# Patient Record
Sex: FEMALE | Race: WHITE | NOT HISPANIC OR LATINO | Employment: FULL TIME | URBAN - METROPOLITAN AREA
[De-identification: names, ages, dates, MRNs, and addresses within clinical notes are randomized per-mention and may not be internally consistent; named-entity substitution may affect disease eponyms.]

---

## 2017-07-26 ENCOUNTER — COMMUNICATION - HEALTHEAST (OUTPATIENT)
Dept: INTERNAL MEDICINE | Facility: CLINIC | Age: 22
End: 2017-07-26

## 2017-07-26 ENCOUNTER — OFFICE VISIT - HEALTHEAST (OUTPATIENT)
Dept: INTERNAL MEDICINE | Facility: CLINIC | Age: 22
End: 2017-07-26

## 2017-07-26 DIAGNOSIS — Z00.00 PREVENTATIVE HEALTH CARE: ICD-10-CM

## 2017-07-26 LAB
CHOLEST SERPL-MCNC: 154 MG/DL
FASTING STATUS PATIENT QL REPORTED: NORMAL
HDLC SERPL-MCNC: 59 MG/DL
LDLC SERPL CALC-MCNC: 82 MG/DL
TRIGL SERPL-MCNC: 64 MG/DL

## 2017-07-26 ASSESSMENT — MIFFLIN-ST. JEOR: SCORE: 1313.19

## 2017-08-03 LAB
BKR LAB AP ABNORMAL BLEEDING: NO
BKR LAB AP BIRTH CONTROL/HORMONES: NORMAL
BKR LAB AP CERVICAL APPEARANCE: NORMAL
BKR LAB AP GYN ADEQUACY: NORMAL
BKR LAB AP GYN INTERPRETATION: NORMAL
BKR LAB AP HPV REFLEX: NORMAL
BKR LAB AP LMP: NORMAL
BKR LAB AP PATIENT STATUS: NO
BKR LAB AP PREVIOUS ABNORMAL: NORMAL
BKR LAB AP PREVIOUS NORMAL: NORMAL
HIGH RISK?: NO
PATH REPORT.COMMENTS IMP SPEC: NORMAL
RESULT FLAG (HE HISTORICAL CONVERSION): NORMAL

## 2019-01-30 ENCOUNTER — OFFICE VISIT - HEALTHEAST (OUTPATIENT)
Dept: INTERNAL MEDICINE | Facility: CLINIC | Age: 24
End: 2019-01-30

## 2019-01-30 DIAGNOSIS — R10.13 ABDOMINAL PAIN, EPIGASTRIC: ICD-10-CM

## 2019-01-30 LAB
ERYTHROCYTE [DISTWIDTH] IN BLOOD BY AUTOMATED COUNT: 13.3 % (ref 11–14.5)
HCT VFR BLD AUTO: 37.2 % (ref 35–47)
HGB BLD-MCNC: 12.5 G/DL (ref 12–16)
LIPASE SERPL-CCNC: 42 U/L (ref 0–52)
MCH RBC QN AUTO: 27 PG (ref 27–34)
MCHC RBC AUTO-ENTMCNC: 33.5 G/DL (ref 32–36)
MCV RBC AUTO: 80 FL (ref 80–100)
PLATELET # BLD AUTO: 260 THOU/UL (ref 140–440)
PMV BLD AUTO: 8.5 FL (ref 7–10)
RBC # BLD AUTO: 4.62 MILL/UL (ref 3.8–5.4)
WBC: 10.2 THOU/UL (ref 4–11)

## 2019-01-30 ASSESSMENT — MIFFLIN-ST. JEOR: SCORE: 1317.72

## 2019-01-31 ENCOUNTER — COMMUNICATION - HEALTHEAST (OUTPATIENT)
Dept: INTERNAL MEDICINE | Facility: CLINIC | Age: 24
End: 2019-01-31

## 2019-02-14 ENCOUNTER — OFFICE VISIT - HEALTHEAST (OUTPATIENT)
Dept: INTERNAL MEDICINE | Facility: CLINIC | Age: 24
End: 2019-02-14

## 2019-02-14 DIAGNOSIS — J02.0 STREPTOCOCCAL SORE THROAT: ICD-10-CM

## 2019-02-14 DIAGNOSIS — J20.8 ACUTE VIRAL BRONCHITIS: ICD-10-CM

## 2019-02-14 LAB — DEPRECATED S PYO AG THROAT QL EIA: NORMAL

## 2019-02-14 ASSESSMENT — MIFFLIN-ST. JEOR: SCORE: 1322.26

## 2019-02-15 LAB — GROUP A STREP BY PCR: NORMAL

## 2019-02-20 ENCOUNTER — COMMUNICATION - HEALTHEAST (OUTPATIENT)
Dept: INTERNAL MEDICINE | Facility: CLINIC | Age: 24
End: 2019-02-20

## 2019-12-24 ENCOUNTER — COMMUNICATION - HEALTHEAST (OUTPATIENT)
Dept: INTERNAL MEDICINE | Facility: CLINIC | Age: 24
End: 2019-12-24

## 2020-01-05 ENCOUNTER — COMMUNICATION - HEALTHEAST (OUTPATIENT)
Dept: INTERNAL MEDICINE | Facility: CLINIC | Age: 25
End: 2020-01-05

## 2020-01-13 ENCOUNTER — COMMUNICATION - HEALTHEAST (OUTPATIENT)
Dept: INTERNAL MEDICINE | Facility: CLINIC | Age: 25
End: 2020-01-13

## 2020-01-13 ENCOUNTER — AMBULATORY - HEALTHEAST (OUTPATIENT)
Dept: INTERNAL MEDICINE | Facility: CLINIC | Age: 25
End: 2020-01-13

## 2020-01-13 DIAGNOSIS — H69.90 DYSFUNCTION OF EUSTACHIAN TUBE, UNSPECIFIED LATERALITY: ICD-10-CM

## 2020-11-03 ENCOUNTER — COMMUNICATION - HEALTHEAST (OUTPATIENT)
Dept: INTERNAL MEDICINE | Facility: CLINIC | Age: 25
End: 2020-11-03

## 2020-11-03 DIAGNOSIS — R50.9 FEVER, UNSPECIFIED FEVER CAUSE: ICD-10-CM

## 2020-11-08 ENCOUNTER — AMBULATORY - HEALTHEAST (OUTPATIENT)
Dept: FAMILY MEDICINE | Facility: CLINIC | Age: 25
End: 2020-11-08

## 2020-11-08 DIAGNOSIS — R50.9 FEVER, UNSPECIFIED FEVER CAUSE: ICD-10-CM

## 2020-11-11 ENCOUNTER — COMMUNICATION - HEALTHEAST (OUTPATIENT)
Dept: SCHEDULING | Facility: CLINIC | Age: 25
End: 2020-11-11

## 2021-04-03 ENCOUNTER — COMMUNICATION - HEALTHEAST (OUTPATIENT)
Dept: INTERNAL MEDICINE | Facility: CLINIC | Age: 26
End: 2021-04-03

## 2021-04-03 DIAGNOSIS — Z20.822 EXPOSURE TO COVID-19 VIRUS: ICD-10-CM

## 2021-04-08 ENCOUNTER — AMBULATORY - HEALTHEAST (OUTPATIENT)
Dept: LAB | Facility: CLINIC | Age: 26
End: 2021-04-08

## 2021-04-08 DIAGNOSIS — Z20.822 EXPOSURE TO COVID-19 VIRUS: ICD-10-CM

## 2021-04-10 ENCOUNTER — COMMUNICATION - HEALTHEAST (OUTPATIENT)
Dept: SCHEDULING | Facility: CLINIC | Age: 26
End: 2021-04-10

## 2021-05-11 ENCOUNTER — OFFICE VISIT - HEALTHEAST (OUTPATIENT)
Dept: INTERNAL MEDICINE | Facility: CLINIC | Age: 26
End: 2021-05-11

## 2021-05-11 DIAGNOSIS — Z12.4 SCREENING FOR CERVICAL CANCER: ICD-10-CM

## 2021-05-11 DIAGNOSIS — M25.532 LEFT WRIST PAIN: ICD-10-CM

## 2021-05-11 DIAGNOSIS — Z00.00 ENCOUNTER FOR PREVENTIVE CARE: ICD-10-CM

## 2021-05-11 ASSESSMENT — MIFFLIN-ST. JEOR: SCORE: 1331.61

## 2021-05-27 VITALS
OXYGEN SATURATION: 98 % | WEIGHT: 148.19 LBS | HEART RATE: 82 BPM | SYSTOLIC BLOOD PRESSURE: 108 MMHG | BODY MASS INDEX: 27.98 KG/M2 | DIASTOLIC BLOOD PRESSURE: 70 MMHG | HEIGHT: 61 IN | RESPIRATION RATE: 18 BRPM

## 2021-05-31 VITALS — HEIGHT: 60 IN | BODY MASS INDEX: 28.47 KG/M2 | WEIGHT: 145 LBS

## 2021-06-02 VITALS — BODY MASS INDEX: 28.66 KG/M2 | HEIGHT: 60 IN | WEIGHT: 146 LBS

## 2021-06-02 VITALS — HEIGHT: 60 IN | BODY MASS INDEX: 28.86 KG/M2 | WEIGHT: 147 LBS

## 2021-06-04 NOTE — TELEPHONE ENCOUNTER
This illness is very suggestive of flu or other viral illness.  Should improve soon.  She could come in for influenza test, but since it has been one week, tamiful would not be effective.  Would recommend ibuprofen, and/or acetaminophen and fluids and rest and be seen if doesn't improve.  Dr. Parish MD

## 2021-06-12 NOTE — PROGRESS NOTES
Assessment and Plan:    . 1. Preventative health care  Immunizations are up-to-date.  Pap smear, breast exam and physical complete.  Will do routine labs    - Gynecologic Cytology (PAP Smear)  - Basic Metabolic Panel  - Lipid Cascade; Future  - HM2(CBC w/o Differential)        Follow-up in 1 year for repeat Pap.  Please note she wore a tampon prior to this examination today.      Chelsi Chang MD  7/26/2017    Chief Complaint:  Chief Complaint   Patient presents with     Annual Exam       History of Present Illness:  Morena is a 22 y.o. female who is here today for preventative care.  Of note, she has no chief complaints today.  She states she is here at the urging of her family to get a checkup.  She has not been seen in several years.  She was last seen here at the age of 16 when she was diagnosed with mononucleosis.  She recovered uneventfully.  She has not had medical care since.    She has really no chief complaint.  She states her life is going well.  She is a manager at a [employer].  She enjoys working with young people and helping them at their first job.    She is working on leading a healthier lifestyle.  She states that she has reduced soda pop intake.  She is trying to eat healthy on the job.  Have discussed the importance also of keeping her bones healthy.  Recommend 3-4 servings of dairy products per day and regular physical activity.  This was discussed today.    Menstrual history obtained.  She is having regular periods.  She denies any vaginal discharge.  She is not sexually active with men.  She denies any exposures to STDs.    Review of Systems:    The rest of the review of systems are negative for all systems.    PFSH:  Social History: She is single.  She lives with 2 roommates in an apartment.  She has a same-sex significant other.  She denies any problems with difficulty with sexuality, discrimination etc.  History   Smoking Status     Never Smoker   Smokeless Tobacco     Not on  "file       Past Medical History: Mono as a teen  No Known Allergies    Family History: Family history reviewed and updated in the electronic health record  Family History   Problem Relation Age of Onset     Breast cancer Mother      Arthritis Father      Hypertension Father      Thyroid disease Sister      Alcohol abuse Brother      Diabetes Paternal Aunt      Diabetes Paternal Uncle        Physical Exam:  Vitals:    07/26/17 1157   BP: 102/74   Patient Site: Left Arm   Patient Position: Sitting   Cuff Size: Adult Regular   Pulse: (!) 102   Weight: 145 lb (65.8 kg)   Height: 5' 0.25\" (1.53 m)     Wt Readings from Last 3 Encounters:   07/26/17 145 lb (65.8 kg)       General Appearance:  Alert, cooperative, no distress, appears stated age   Head:  Normocephalic, without obvious abnormality, atraumatic   Eyes:  PERRL, conjunctiva/corneas clear, EOM's intact   Ears:  Normal TM's and external ear canals, both ears   Nose: Nares normal, septum midline,mucosa normal, no drainage    Throat: Lips, mucosa, and tongue normal; teeth and gums normal   Neck: Supple, symmetrical, trachea midline, no adenopathy;  thyroid: not enlarged, symmetric, no tenderness/mass/nodules; no carotid bruit or JVD   Back:   Symmetric, no curvature, ROM normal, no CVA tenderness   Lungs:   Clear to auscultation bilaterally, respirations unlabored   Breasts:  No breast masses, tenderness, asymmetry, or nipple discharge.   Heart:  Regular rate and rhythm, S1 and S2 normal, no murmur, rub, or gallop   Abdomen:   Soft, non-tender, bowel sounds active all four quadrants,  no masses, no organomegaly   Genitalia: Normally developed genitalia with no external lesions or eruptions.  Vagina and cervix show no lesions, inflammation, discharge or tenderness.  No cystocele.  Uterus normal size and position.  No adnexal mass or tenderness.   Rectal:   deferred    Extremities: Extremities normal, atraumatic, no cyanosis or edema   Skin: Skin color, texture, turgor " normal, no rashes or lesions   Lymph nodes: Cervical, supraclavicular, and axillary nodes normal   Neurologic: Normal, CN 2-12 intact     Medications:  No current outpatient prescriptions on file.     No current facility-administered medications for this visit.        Immunizations:  Immunization History   Administered Date(s) Administered     Influenza, inj, historic 11/01/2012     Influenza, seasonal,quad inj 6-35 mos 01/09/2001     MMR 07/28/2011     Tdap 07/28/2011     Varicella 11/10/2003

## 2021-06-12 NOTE — TELEPHONE ENCOUNTER
Spoke with patient. Offered her appt today for testing at [facility]. Pt declined. She is requesting Sunday. No appt on [facility] side of scheduling. She will call the scheduling number and see what is available. Pt is not quarantining at this time.

## 2021-06-17 NOTE — PROGRESS NOTES
Assessment and Plan:     1. Encounter for preventive care  Pap smear updated today. Labs deferred until next year. Encourage Covid vaccine before tetanus shot.  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 Encourage regular exercise    2. Screening for cervical cancer    - Gynecologic Cytology (PAP Smear)      3. Left wrist pain  Likely secondary to repetitive motion from her job as a       Chelsi Chang MD  5/11/2021    Chief Complaint:  Chief Complaint   Patient presents with     Annual Exam       History of Present Illness:  Morena is a 26 y.o. female who is here today for a physical examination. Of note, I have not seen her in approximately 4 years. In general, she is doing well. 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 She is very physically active and involved in a variety of tasks on that job. He does get a discrete tendon type pain after repetitive movement. She did purchase an over-the-counter splint. She is wondering what she can do for prevention.    Lifestyle is reviewed. She is due to update dental care. She is due for coronavirus vaccination as well as a tetanus shot. She is due for dental and eye visits.    She is currently living with her mom. She is not in a relationship and is not sexually active. She is a non-smoker    Review of Systems:   Postmenstrual vaginal discharge. The rest of the review of systems are negative for all systems.    PFSH:  Social History: Single-lives with her mom currently. She is a shift lead/manager at [employer]  Social History     Tobacco Use   Smoking Status Never Smoker   Smokeless Tobacco Never Used       Past Medical History:   No Known Allergies    Family History: Her mother and maternal aunt have had breast cancer.  Family History   Problem Relation Age of Onset     Breast cancer Mother      Arthritis Father      Hypertension Father      Thyroid disease Sister      Alcohol abuse Brother      Diabetes Paternal Aunt       "Diabetes Paternal Uncle        Physical Exam:  Vitals:    05/11/21 1023   BP: 108/70   Patient Site: Left Arm   Patient Position: Sitting   Cuff Size: Adult Regular   Pulse: 82   Resp: 18   SpO2: 98%   Weight: 148 lb 3 oz (67.2 kg)   Height: 5' 0.5\" (1.537 m)     Wt Readings from Last 3 Encounters:   05/11/21 148 lb 3 oz (67.2 kg)   02/14/19 147 lb (66.7 kg)   01/30/19 146 lb (66.2 kg)       General Appearance:  Alert, cooperative, no distress, appears stated age   Head:  Normocephalic, without obvious abnormality, atraumatic   Eyes:  PERRL, conjunctiva/corneas clear, EOM's intact   Ears:  Normal TM's and external ear canals, both ears   Nose:  Patient remains masked during examination   Throat:    Neck: Supple, symmetrical, trachea midline, no adenopathy;  thyroid: not enlarged, symmetric, no tenderness/mass/nodules; no carotid bruit or JVD   Back:   Symmetric, no curvature, ROM normal, no CVA tenderness   Lungs:   Clear to auscultation bilaterally, respirations unlabored   Breasts:  No breast masses, tenderness, asymmetry, or nipple discharge.   Heart:  Regular rate and rhythm, S1 and S2 normal, no murmur, rub, or gallop   Abdomen:   Soft, non-tender, bowel sounds active all four quadrants,  no masses, no organomegaly   Genitalia: Normally developed genitalia with no external lesions or eruptions.  Vagina and cervix show no lesions, inflammation,  or tenderness. There is a scant white discharge noted. No cystocele.  Uterus normal size and position.  No adnexal mass or tenderness.   Rectal:     Extremities: Extremities normal, atraumatic, no cyanosis or edema   Skin: Skin color, texture, turgor normal, no rashes or lesions   Lymph nodes: Cervical, supraclavicular, and axillary nodes normal   Neurologic: Normal, CN 2-12 intact     Medications:  No current outpatient medications on file.     No current facility-administered medications for this visit.        Immunizations:  Immunization History   Administered Date(s) " Administered     INFLUENZA,SEASONAL QUAD, PF, =/> 6months 11/01/2020     Influenza, inj, historic,unspecified 11/01/2012, 11/01/2019, 11/01/2020     Influenza, seasonal,quad inj 6-35 mos 01/09/2001     Influenza,inj,MDCK,PF,Quad >4yrs 11/13/2019     MMR 07/28/2011     Td, Adult, Absorbed 01/01/2005     Tdap 07/28/2011     Varicella 11/10/2003

## 2021-06-18 NOTE — LETTER
Letter by Alonzo Bustamante MD at      Author: Alonzo Bustamante MD Service: -- Author Type: --    Filed:  Encounter Date: 1/31/2019 Status: (Other)       Morena Solorzano  [address]  [address] [place] [address]             January 31, 2019         Dear Ms. Solorzano,    Below are the results from your recent visit:    Resulted Orders   HM2(CBC w/o Differential)   Result Value Ref Range    WBC 10.2 4.0 - 11.0 thou/uL    RBC 4.62 3.80 - 5.40 mill/uL    Hemoglobin 12.5 12.0 - 16.0 g/dL    Hematocrit 37.2 35.0 - 47.0 %    MCV 80 80 - 100 fL    MCH 27.0 27.0 - 34.0 pg    MCHC 33.5 32.0 - 36.0 g/dL    RDW 13.3 11.0 - 14.5 %    Platelets 260 140 - 440 thou/uL    MPV 8.5 7.0 - 10.0 fL   Lipase   Result Value Ref Range    Lipase 42 0 - 52 U/L       Morena, both your blood counts and your pancreas test (lipase) are normal.    Please call with questions or contact us using [facility].    Sincerely,        Electronically signed by Alonzo Bustamante MD

## 2021-06-23 NOTE — PROGRESS NOTES
Office Visit - Follow Up   Morena Solorzano   24 y.o. female    Date of Visit: 1/30/2019    Chief Complaint   Patient presents with     Abdominal Pain     Upper mid abdominal pain, started last night, no nausea or vomiting, pain ~5 (1-10)        Assessment and Plan   1. Abdominal pain, epigastric  Onset just began at 10 PM last night.  No previous abdominal  pains.  No other substantial risk factors.  Mom will give her a dose of Pepcid tonight.  And will start some over-the-counter Prilosec at 20 mg daily.  We will check a CBC and a lipase today.  If if her pain progresses to the right upper quadrant and we should look into ultrasound.  Has a very benign abdomen at this time.  - HM2(CBC w/o Differential)  - Lipase          No Follow-up on file.     History of Present Illness   This 24 y.o. old with no real active medical problems.  Developed a sudden onset of epigastric pain last evening at about 10 PM.  It persisted throughout the night but she was able to sleep.  She got up this morning and is still having the same pain.  Felt a bit better with eating something.  She reports no nausea no vomiting no diarrhea no constipation no melena no bright red blood per rectum.  No fevers no chills.  Drinks very little alcohol.  She uses no illicit drugs.  She does not use much at all in the way of NSAIDs.  She tells me the pain somewhat radiates towards the back but not severely.  Pain is generally in the left side of the epigastric area.  She works at [employer] in [place] and had to take off work today and she will miss work tomorrow 2.  She admits that she is under a bit of stress trying to manage her work schedule.  Review of Systems: A comprehensive review of systems was negative except as noted.     Medications, Allergies and Problem List   Reviewed, reconciled and updated     Physical Exam   General Appearance:       /62 (Patient Site: Right Arm, Patient Position: Sitting, Cuff Size: Adult Large)   Pulse 70   " Ht 5' 0.25\" (1.53 m)   Wt 146 lb (66.2 kg)   SpO2 100%   BMI 28.28 kg/m    No prominent epigastric tenderness to palpation.  No masses palpable.  Liver and spleen are not palpably enlarged and nontender.  Bowel sounds are normal.  No guarding no rebound.  No abdominal pain at all to palpation of the remainder the abdomen.       Additional Information   No current outpatient medications on file.     No current facility-administered medications for this visit.      No Known Allergies  Social History     Tobacco Use     Smoking status: Never Smoker   Substance Use Topics     Alcohol use: Not on file     Drug use: Not on file       Review and/or order of clinical lab tests:  Review and/or order of radiology tests:  Review and/or order of medicine tests:  Discussion of test results with performing physician:  Decision to obtain old records and/or obtain history from someone other than the patient:  Review and summarization of old records and/or obtaining history from someone other than the patient and.or discussion of case with another health care provider:  Independent visualization of image, tracing or specimen itself:    Time: total time spent with the patient was 15 minutes of which >50% was spent in counseling and coordination of care     Alonzo Bustamante MD    "

## 2021-06-24 NOTE — PROGRESS NOTES
"  Office Visit - Follow Up   Morena Solorzano   24 y.o. female    Date of Visit: 2/14/2019    Chief Complaint   Patient presents with     Sore Throat     x 4 days Sxs: sore throat, mouth odor         Assessment and Plan   1. Streptococcal sore throat  Strep test is negative will await RNA results.  - Rapid Strep A Screen-Throat  - Group A Strep, RNA Direct Detection, Throat    2. Acute viral bronchitis  The residual sore throat was her main complaint.  He seems to be doing well with resolution of her other symptoms as described below.  We did not check a nasal swab for influenza          No Follow-up on file.     History of Present Illness   This 24 y.o. old reporting onset 3 days ago of sore throat and feverishness.  She had associated aches and chills.  Did not check her temperature.  Symptoms were not severe.  She now reports that her feverishness and chills has resolved.  Her body aches have resolved and her sore throat has persisted.  She has had a mild dry cough but nothing at all prominent.    Review of Systems: A comprehensive review of systems was negative except as noted.     Medications, Allergies and Problem List   Reviewed, reconciled and updated     Physical Exam   General Appearance:       /62 (Patient Site: Left Arm, Patient Position: Sitting, Cuff Size: Adult Regular)   Pulse 90   Temp 98.6  F (37  C)   Ht 5' 0.25\" (1.53 m)   Wt 147 lb (66.7 kg)   SpO2 99%   BMI 28.47 kg/m      Throat is minimally red.  No adenopathy in her posterior nor anterior cervical chain lungs sound entirely clear.  Rapid strep test negative as above     Additional Information   No current outpatient medications on file.     No current facility-administered medications for this visit.      No Known Allergies  Social History     Tobacco Use     Smoking status: Never Smoker     Smokeless tobacco: Never Used   Substance Use Topics     Alcohol use: Not on file     Drug use: Not on file       Review and/or order of " clinical lab tests:  Review and/or order of radiology tests:  Review and/or order of medicine tests:  Discussion of test results with performing physician:  Decision to obtain old records and/or obtain history from someone other than the patient:  Review and summarization of old records and/or obtaining history from someone other than the patient and.or discussion of case with another health care provider:  Independent visualization of image, tracing or specimen itself:    Time: total time spent with the patient was 15 minutes of which >50% was spent in counseling and coordination of care     Alonzo Bustamante MD

## 2021-06-24 NOTE — TELEPHONE ENCOUNTER
Dr. Bustamante,  Please review message below and advise.  Thank you.  Yusra STILES, CMA/CMT....................7:07 AM

## 2021-10-16 ENCOUNTER — HEALTH MAINTENANCE LETTER (OUTPATIENT)
Age: 26
End: 2021-10-16

## 2022-06-25 ASSESSMENT — ENCOUNTER SYMPTOMS
DIARRHEA: 0
NAUSEA: 0
SORE THROAT: 0
DYSURIA: 0
ABDOMINAL PAIN: 0
HEMATOCHEZIA: 0
ARTHRALGIAS: 0
FREQUENCY: 0
DIZZINESS: 0
HEARTBURN: 0
COUGH: 0
EYE PAIN: 0
MYALGIAS: 0
FEVER: 0
HEMATURIA: 0
CONSTIPATION: 0
BREAST MASS: 0
NERVOUS/ANXIOUS: 1
HEADACHES: 0
SHORTNESS OF BREATH: 0
WEAKNESS: 0
JOINT SWELLING: 0
PARESTHESIAS: 0
CHILLS: 0
PALPITATIONS: 0

## 2022-06-25 ASSESSMENT — PATIENT HEALTH QUESTIONNAIRE - PHQ9
SUM OF ALL RESPONSES TO PHQ QUESTIONS 1-9: 16
SUM OF ALL RESPONSES TO PHQ QUESTIONS 1-9: 16
10. IF YOU CHECKED OFF ANY PROBLEMS, HOW DIFFICULT HAVE THESE PROBLEMS MADE IT FOR YOU TO DO YOUR WORK, TAKE CARE OF THINGS AT HOME, OR GET ALONG WITH OTHER PEOPLE: VERY DIFFICULT

## 2022-06-29 ENCOUNTER — OFFICE VISIT (OUTPATIENT)
Dept: INTERNAL MEDICINE | Facility: CLINIC | Age: 27
End: 2022-06-29
Payer: COMMERCIAL

## 2022-06-29 VITALS
SYSTOLIC BLOOD PRESSURE: 118 MMHG | RESPIRATION RATE: 18 BRPM | WEIGHT: 137.9 LBS | BODY MASS INDEX: 27.07 KG/M2 | DIASTOLIC BLOOD PRESSURE: 60 MMHG | HEART RATE: 84 BPM | OXYGEN SATURATION: 100 % | HEIGHT: 60 IN

## 2022-06-29 DIAGNOSIS — Z80.3 FAMILY HISTORY OF MALIGNANT NEOPLASM OF BREAST: ICD-10-CM

## 2022-06-29 DIAGNOSIS — E03.9 ACQUIRED HYPOTHYROIDISM: ICD-10-CM

## 2022-06-29 DIAGNOSIS — Z11.4 SCREENING FOR HIV WITHOUT PRESENCE OF RISK FACTORS: ICD-10-CM

## 2022-06-29 DIAGNOSIS — F32.1 MAJOR DEPRESSIVE DISORDER, SINGLE EPISODE, MODERATE (H): ICD-10-CM

## 2022-06-29 DIAGNOSIS — Z00.00 ENCOUNTER FOR PREVENTIVE CARE: Primary | ICD-10-CM

## 2022-06-29 DIAGNOSIS — Z23 NEED FOR TDAP VACCINATION: ICD-10-CM

## 2022-06-29 LAB
ANION GAP SERPL CALCULATED.3IONS-SCNC: 13 MMOL/L (ref 7–15)
BUN SERPL-MCNC: 9 MG/DL (ref 6–20)
CALCIUM SERPL-MCNC: 9.9 MG/DL (ref 8.6–10)
CHLORIDE SERPL-SCNC: 103 MMOL/L (ref 98–107)
CHOLEST SERPL-MCNC: 180 MG/DL
CREAT SERPL-MCNC: 0.64 MG/DL (ref 0.51–0.95)
DEPRECATED HCO3 PLAS-SCNC: 25 MMOL/L (ref 22–29)
ERYTHROCYTE [DISTWIDTH] IN BLOOD BY AUTOMATED COUNT: 14.2 % (ref 10–15)
GFR SERPL CREATININE-BSD FRML MDRD: >90 ML/MIN/1.73M2
GLUCOSE SERPL-MCNC: 76 MG/DL (ref 70–99)
HBA1C MFR BLD: 5.3 % (ref 0–5.6)
HCT VFR BLD AUTO: 39.6 % (ref 35–47)
HDLC SERPL-MCNC: 75 MG/DL
HGB BLD-MCNC: 12.8 G/DL (ref 11.7–15.7)
LDLC SERPL CALC-MCNC: 91 MG/DL
MCH RBC QN AUTO: 27.1 PG (ref 26.5–33)
MCHC RBC AUTO-ENTMCNC: 32.3 G/DL (ref 31.5–36.5)
MCV RBC AUTO: 84 FL (ref 78–100)
NONHDLC SERPL-MCNC: 105 MG/DL
PLATELET # BLD AUTO: 296 10E3/UL (ref 150–450)
POTASSIUM SERPL-SCNC: 4.1 MMOL/L (ref 3.4–5.3)
RBC # BLD AUTO: 4.72 10E6/UL (ref 3.8–5.2)
SODIUM SERPL-SCNC: 141 MMOL/L (ref 136–145)
T4 FREE SERPL-MCNC: 1.08 NG/DL (ref 0.9–1.7)
TRIGL SERPL-MCNC: 70 MG/DL
TSH SERPL DL<=0.005 MIU/L-ACNC: 9.64 UIU/ML (ref 0.3–4.2)
WBC # BLD AUTO: 6.4 10E3/UL (ref 4–11)

## 2022-06-29 PROCEDURE — 87389 HIV-1 AG W/HIV-1&-2 AB AG IA: CPT | Performed by: INTERNAL MEDICINE

## 2022-06-29 PROCEDURE — 90715 TDAP VACCINE 7 YRS/> IM: CPT | Performed by: INTERNAL MEDICINE

## 2022-06-29 PROCEDURE — 90471 IMMUNIZATION ADMIN: CPT | Performed by: INTERNAL MEDICINE

## 2022-06-29 PROCEDURE — 84439 ASSAY OF FREE THYROXINE: CPT | Performed by: INTERNAL MEDICINE

## 2022-06-29 PROCEDURE — 83036 HEMOGLOBIN GLYCOSYLATED A1C: CPT | Performed by: INTERNAL MEDICINE

## 2022-06-29 PROCEDURE — 80048 BASIC METABOLIC PNL TOTAL CA: CPT | Performed by: INTERNAL MEDICINE

## 2022-06-29 PROCEDURE — 36415 COLL VENOUS BLD VENIPUNCTURE: CPT | Performed by: INTERNAL MEDICINE

## 2022-06-29 PROCEDURE — 80061 LIPID PANEL: CPT | Performed by: INTERNAL MEDICINE

## 2022-06-29 PROCEDURE — 85027 COMPLETE CBC AUTOMATED: CPT | Performed by: INTERNAL MEDICINE

## 2022-06-29 PROCEDURE — 99395 PREV VISIT EST AGE 18-39: CPT | Mod: 25 | Performed by: INTERNAL MEDICINE

## 2022-06-29 PROCEDURE — 84443 ASSAY THYROID STIM HORMONE: CPT | Performed by: INTERNAL MEDICINE

## 2022-06-29 ASSESSMENT — ENCOUNTER SYMPTOMS
BREAST MASS: 0
EYE PAIN: 0
MYALGIAS: 0
SHORTNESS OF BREATH: 0
JOINT SWELLING: 0
HEADACHES: 0
SORE THROAT: 0
DYSURIA: 0
HEMATOCHEZIA: 0
PARESTHESIAS: 0
ABDOMINAL PAIN: 0
COUGH: 0
CONSTIPATION: 0
HEMATURIA: 0
DIZZINESS: 0
WEAKNESS: 0
DIARRHEA: 0
ARTHRALGIAS: 0
CHILLS: 0
HEARTBURN: 0
PALPITATIONS: 0
FEVER: 0
FREQUENCY: 0
NERVOUS/ANXIOUS: 1
NAUSEA: 0

## 2022-06-29 NOTE — PROGRESS NOTES
"   SUBJECTIVE:   CC: Morena Solorzano is an 27 year old woman who presents for preventive health visit.       Patient has been advised of split billing requirements and indicates understanding: Yes  Morena is a delightful 27-year-old female who is here today for an annual physical examination.  Overall, she is healthy.  She has had some minor issues over the years.  She contacted me in August with some minor vaginal spotting.  She was referred to gynecology.  However, this past and she has not had any further recurrent symptoms.  Her menstrual cycle is occurring on a regular basis every 28 to 29 days.  She is not on birth control currently and is not sexually active.    She has some concerns today regarding mood.  Her PHQ-9 is approximately 16.  She states she has intermittent episodes of depression.  She states she believes that these tend to occur around the time of her menstrual cycle.  She notes irritability and an overall sad mood.  She states that she can \"get myself out of it \".  She states that she is not interested in medications.  She has some concerns about this.  She states she would try more natural remedies.  She has started herself on vitamin D, magnesium and some other supplements.  Of note, she does not exercise regularly.  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  She also is a , but will be resigning from this position to work on her overall health.    She states that she is not suicidal.  She feels sad at times.  She has low motivation and energy at times.  She states she had a 3-month vacation in [place] with a friend.  She states this was very beneficial.    Her family history is reviewed and updated.  Her brother has issues with chemical dependence.  Her mother and sister have some history of depression and have been on medications in the past.    There is a strong family history of breast cancer.  Her mother, aunt and maternal grandmother have " all had breast cancer.  Her mother is dealing with her second occurrence.    Healthy Habits:     Getting at least 3 servings of Calcium per day:  NO    Bi-annual eye exam:  NO    Dental care twice a year:  NO    Sleep apnea or symptoms of sleep apnea:  Daytime drowsiness    Diet:  Regular (no restrictions) and Other    Frequency of exercise:  2-3 days/week    Duration of exercise:  Less than 15 minutes    Taking medications regularly:  Yes    Medication side effects:  None    PHQ-2 Total Score: 3    Additional concerns today:  Yes            Today's PHQ-2 Score:   PHQ-2 ( 1999 Pf[employer]) 6/25/2022   Q1: Little interest or pleasure in doing things 1   Q2: Feeling down, depressed or hopeless 2   PHQ-2 Score 3   Q1: Little interest or pleasure in doing things Several days   Q2: Feeling down, depressed or hopeless More than half the days   PHQ-2 Score 3     Answers for HPI/ROS submitted by the patient on 6/25/2022  If you checked off any problems, how difficult have these problems made it for you to do your work, take care of things at home, or get along with other people?: Very difficult  PHQ9 TOTAL SCORE: 16        Abuse: Current or Past (Physical, Sexual or Emotional) - No  Do you feel safe in your environment? Yes    Have you ever done Advance Care Planning? (For example, a Health Directive, POLST, or a discussion with a medical provider or your loved ones about your wishes): No, advance care planning information given to patient to review.  Patient plans to discuss their wishes with loved ones or provider.      Social History     Tobacco Use     Smoking status: Never Smoker     Smokeless tobacco: Never Used   Substance Use Topics     Alcohol use: Not on file     If you drink alcohol do you typically have >3 drinks per day or >7 drinks per week? No    Alcohol Use 6/25/2022   Prescreen: >3 drinks/day or >7 drinks/week? No   No flowsheet data found.    Reviewed orders with patient.  Reviewed health maintenance and  updated orders accordingly - Yes  Lab work is in process    Breast Cancer Screening: She will inquire about whether genetic screening was done on her mother.  We will begin breast cancer screening at age 35.    FHS-7:   Breast CA Risk Assessment (FHS-7) 6/25/2022   Did any of your first-degree relatives have breast or ovarian cancer? Yes   Did any of your relatives have bilateral breast cancer? Yes   Did any man in your family have breast cancer? No   Did any woman in your family have breast and ovarian cancer? No   Did any woman in your family have breast cancer before age 50 y? Yes   Do you have 2 or more relatives with breast and/or ovarian cancer? Yes   Do you have 2 or more relatives with breast and/or bowel cancer? Yes         History of abnormal Pap smear:  PAP / HPV Latest Ref Rng & Units 5/11/2021 7/26/2017   PAP Negative for squamous intraepithelial lesion or malignancy. Negative for squamous intraepithelial lesion or malignancy  Electronically signed by Lneka Mejia CT (ASCP) on 5/12/2021 at  2:48 PM   Negative for squamous intraepithelial lesion or malignancy  Electronically signed by Nitza Noriega CT (ASCP) on 8/3/2017 at 11:54 AM       Reviewed and updated as needed this visit by clinical staff    Allergies  Meds                Reviewed and updated as needed this visit by Provider                   History reviewed. No pertinent past medical history.     Review of Systems   Constitutional: Negative for chills and fever.   HENT: Positive for congestion. Negative for ear pain, hearing loss and sore throat.    Eyes: Positive for visual disturbance. Negative for pain.   Respiratory: Negative for cough and shortness of breath.    Cardiovascular: Negative for chest pain, palpitations and peripheral edema.   Gastrointestinal: Negative for abdominal pain, constipation, diarrhea, heartburn, hematochezia and nausea.   Breasts:  Negative for tenderness, breast mass and discharge.   Genitourinary:  "Negative for dysuria, frequency, genital sores, hematuria, pelvic pain, urgency, vaginal bleeding and vaginal discharge.   Musculoskeletal: Negative for arthralgias, joint swelling and myalgias.   Skin: Negative for rash.   Neurological: Negative for dizziness, weakness, headaches and paresthesias.   Psychiatric/Behavioral: Positive for mood changes. The patient is nervous/anxious.      CONSTITUTIONAL: NEGATIVE for fever, chills, change in weight  INTEGUMENTARY/SKIN: NEGATIVE for worrisome rashes, moles or lesions  EYES: NEGATIVE for vision changes or irritation  ENT: NEGATIVE for ear, mouth and throat problems  RESP: NEGATIVE for significant cough or SOB  BREAST: NEGATIVE for masses, tenderness or discharge  CV: NEGATIVE for chest pain, palpitations or peripheral edema  GI: NEGATIVE for nausea, abdominal pain, heartburn, or change in bowel habits  : NEGATIVE for unusual urinary or vaginal symptoms. No vaginal bleeding.  MUSCULOSKELETAL: NEGATIVE for significant arthralgias or myalgia  NEURO: NEGATIVE for weakness, dizziness or paresthesias  PSYCHIATRIC: NEGATIVE for changes in mood or affect      OBJECTIVE:   Resp 18   Ht 1.53 m (5' 0.25\")   Wt 62.6 kg (137 lb 14.4 oz)   LMP 06/15/2022   BMI 26.71 kg/m    Physical Exam  GENERAL APPEARANCE: healthy, alert and no distress  EYES: Eyes grossly normal to inspection, PERRL and conjunctivae and sclerae normal  HENT: ear canals and TM's normal, nose and mouth without ulcers or lesions, oropharynx clear and oral mucous membranes moist  NECK: no adenopathy, no asymmetry, masses, or scars and thyroid normal to palpation  RESP: lungs clear to auscultation - no rales, rhonchi or wheezes  BREAST: normal without masses, tenderness or nipple discharge and no palpable axillary masses or adenopathy  CV: regular rate and rhythm, normal S1 S2, no S3 or S4, no murmur, click or rub, no peripheral edema and peripheral pulses strong  ABDOMEN: soft, nontender, no hepatosplenomegaly, " no masses and bowel sounds normal  MS: no musculoskeletal defects are noted and gait is age appropriate without ataxia  SKIN: no suspicious lesions or rashes  NEURO: Normal strength and tone, sensory exam grossly normal, mentation intact and speech normal  PSYCH: mentation appears normal and affect normal/bright        ASSESSMENT/PLAN:   (Z00.00) Encounter for preventive care  (primary encounter diagnosis)  Comment: We will update screening labs today.  Encourage regular daily exercise and good nutrition.  Plan: CBC with platelets, Hemoglobin A1c, Basic         metabolic panel, Lipid panel reflex to direct         LDL Fasting            (F32.1) Major depressive disorder, single episode, moderate (H)  Comment: Have discussed depression in detail.  Currently, she would like to hold on medications.  However, I recommend that she establish with a therapist and a referral was placed.  She is also given information to inquire with [facility] as well.  She should begin a healthy exercise program.  I agree that vitamin D and magnesium could be helpful.    For medication, I would like her to inquire about family history use of meds and things that have been helpful.  Also, she may be a good candidate for fluoxetine and that she has symptoms that also may be consistent with PMDD.  Fluoxetine has a longer half-life and with her erratic schedule, this might be something helpful to avoid withdrawal if she is late on a dose/etc.  She is going to contemplate this.  Plan: Adult Mental Health  Referral, TSH         with free T4 reflex        Would like to follow-up with her in 6 weeks    (Z80.3) Family history of malignant neoplasm of breast  Comment: Encourage information gathering with regard to genetic status of mother or grandmother  Plan: Screening for breast cancer should begin mid 30s    (Z11.4) Screening for HIV without presence of risk factors  Comment:   Plan: HIV Antigen Antibody Combo         "    (Z23) Need for Tdap vaccination  Comment:   Plan: TDAP VACCINE (Adacel, Boostrix)  [[id number]]              Patient has been advised of split billing requirements and indicates understanding: No    COUNSELING:  Reviewed preventive health counseling, as reflected in patient instructions       Regular exercise    Estimated body mass index is 26.71 kg/m  as calculated from the following:    Height as of this encounter: 1.53 m (5' 0.25\").    Weight as of this encounter: 62.6 kg (137 lb 14.4 oz).    Weight management plan: Discussed healthy diet and exercise guidelines    She reports that she has never smoked. She has never used smokeless tobacco.      Counseling Resources:  ATP IV Guidelines  Pooled Cohorts Equation Calculator  Breast Cancer Risk Calculator  BRCA-Related Cancer Risk Assessment: FHS-7 Tool  FRAX Risk Assessment  ICSI Preventive Guidelines  Dietary Guidelines for Americans, 2010  USDA's MyPlate  ASA Prophylaxis  Lung CA Screening    Chelsi Chang MD  [facility]    "

## 2022-06-30 LAB — HIV 1+2 AB+HIV1 P24 AG SERPL QL IA: NONREACTIVE

## 2022-06-30 RX ORDER — LEVOTHYROXINE SODIUM 75 UG/1
75 TABLET ORAL DAILY
Qty: 90 TABLET | Refills: 3 | Status: SHIPPED | OUTPATIENT
Start: 2022-06-30 | End: 2022-10-06

## 2022-09-25 ENCOUNTER — HEALTH MAINTENANCE LETTER (OUTPATIENT)
Age: 27
End: 2022-09-25

## 2022-10-06 ENCOUNTER — MYC MEDICAL ADVICE (OUTPATIENT)
Dept: INTERNAL MEDICINE | Facility: CLINIC | Age: 27
End: 2022-10-06

## 2022-10-06 DIAGNOSIS — E03.9 ACQUIRED HYPOTHYROIDISM: ICD-10-CM

## 2022-10-06 RX ORDER — LEVOTHYROXINE SODIUM 75 UG/1
75 TABLET ORAL DAILY
Qty: 90 TABLET | Refills: 3 | Status: SHIPPED | OUTPATIENT
Start: 2022-10-06 | End: 2022-10-18

## 2022-10-11 ENCOUNTER — LAB (OUTPATIENT)
Dept: LAB | Facility: CLINIC | Age: 27
End: 2022-10-11
Payer: COMMERCIAL

## 2022-10-11 DIAGNOSIS — E03.9 ACQUIRED HYPOTHYROIDISM: ICD-10-CM

## 2022-10-11 LAB
T4 FREE SERPL-MCNC: 0.99 NG/DL (ref 0.9–1.7)
TSH SERPL DL<=0.005 MIU/L-ACNC: 5.95 UIU/ML (ref 0.3–4.2)

## 2022-10-11 PROCEDURE — 84443 ASSAY THYROID STIM HORMONE: CPT

## 2022-10-11 PROCEDURE — 84439 ASSAY OF FREE THYROXINE: CPT

## 2022-10-11 PROCEDURE — 36415 COLL VENOUS BLD VENIPUNCTURE: CPT

## 2022-10-18 DIAGNOSIS — E03.9 ACQUIRED HYPOTHYROIDISM: ICD-10-CM

## 2022-10-18 RX ORDER — LEVOTHYROXINE SODIUM 88 UG/1
88 TABLET ORAL DAILY
Qty: 90 TABLET | Refills: 1 | Status: SHIPPED | OUTPATIENT
Start: 2022-10-18 | End: 2023-07-27

## 2023-01-13 ENCOUNTER — E-VISIT (OUTPATIENT)
Dept: URGENT CARE | Facility: CLINIC | Age: 28
End: 2023-01-13
Payer: COMMERCIAL

## 2023-01-13 DIAGNOSIS — J01.90 ACUTE SINUSITIS WITH SYMPTOMS > 10 DAYS: Primary | ICD-10-CM

## 2023-01-13 PROCEDURE — 99421 OL DIG E/M SVC 5-10 MIN: CPT | Performed by: PHYSICIAN ASSISTANT

## 2023-01-13 NOTE — PATIENT INSTRUCTIONS
Dear Morena Solorzano,    After reviewing your responses, I've been able to diagnose you with?a sinus infection caused by a bacteria.?Sinusitis almost always starts as a viral infection but antibiotics are recommend to treat a secondary bacterial infection if symptoms persist beyond 14 days. I sent in Augmentin to your pharmacy to treat this infection.    Symptomatic care will help you feel better. It is important to stay well hydrated and get lots of rest. If you are able to taking the following over the counter medications per your primary care provider, these will help ease your symptoms. A decongestant like Sudafed (pseudoephedrine) is available behind the pharmacist counter and helps to relieve congestion. Take Tylenol or an NSAID such as ibuprofen or naproxen as needed for pain. A nasal rinse such as the Netti pot with bottled or distilled water and saline packets helps to flush sinuses. Mucinex (guiafenesin) thins mucus and may help it to loosen more quickly. Flonase (fluticasone nasal spray), 2 sprays in each nostril daily, reduces swelling in your nasal passages. You can also sit in the bathroom with the door closed and hot shower running to loosen mucus.    If your symptoms significantly worsen, you develop severe headache, vomiting, high fever (>102F), or are not improving in 7 days, please contact your primary care provider for an appointment or visit any of our convenient Walk-in Care or Urgent Care Centers to be seen which can be found on our website?here.?     Thanks again for choosing?us?as your health care partner,?   ?  Zari Padilla PA-C?

## 2023-02-26 ENCOUNTER — E-VISIT (OUTPATIENT)
Dept: URGENT CARE | Facility: CLINIC | Age: 28
End: 2023-02-26
Payer: COMMERCIAL

## 2023-02-26 DIAGNOSIS — H10.31 ACUTE BACTERIAL CONJUNCTIVITIS OF RIGHT EYE: Primary | ICD-10-CM

## 2023-02-26 PROCEDURE — 99421 OL DIG E/M SVC 5-10 MIN: CPT | Performed by: FAMILY MEDICINE

## 2023-02-26 RX ORDER — POLYMYXIN B SULFATE AND TRIMETHOPRIM 1; 10000 MG/ML; [USP'U]/ML
SOLUTION OPHTHALMIC
Qty: 10 ML | Refills: 0 | Status: SHIPPED | OUTPATIENT
Start: 2023-02-26 | End: 2023-03-05

## 2023-02-26 NOTE — PATIENT INSTRUCTIONS
Thank you for choosing us for your care. I have placed an order for a prescription so that you can start treatment. View your full visit summary for details by clicking on the link below. Your pharmacist will able to address any questions you may have about the medication.     If you re not feeling better within 2-3 days, please schedule an appointment.  You can schedule an appointment right here in [facility], or call [phone]  If the visit is for the same symptoms as your eVisit, we ll refund the cost of your eVisit if seen within seven days.      Bacterial Conjunctivitis    You have an infection in the membranes covering the white part of the eye. This part of the eye is called the conjunctiva. The infection is called conjunctivitis. The most common symptoms of conjunctivitis include a thick, pus-like discharge from the eye, swollen eyelids, redness, eyelids sticking together upon awakening, and a gritty or scratchy feeling in the eye. Your infection was caused by bacteria. It may be treated with medicine. With treatment, the infection takes about 7 to 10 days to resolve.   Home care    Use prescribed antibiotic eye drops or ointment as directed to treat the infection.    Apply a warm compress (towel soaked in warm water) to the affected eye 3 to 4 times a day. Do this just before applying medicine to the eye.    Use a warm, wet cloth to wipe away crusting of the eyelids in the morning. This is caused by mucus drainage during the night. You may also use saline irrigating solution or artificial tears to rinse away mucus in the eye. Do not put a patch over the eye.    Wash your hands before and after touching the infected eye. This is to prevent spreading the infection to the other eye, and to other people. Don't share your towels or washcloths with others.    You may use acetaminophen or ibuprofen to control pain, unless another medicine was prescribed. Talk with your healthcare provider before using these  medicines if you have chronic liver or kidney disease. Also talk with your provider if you have ever had a stomach ulcer or digestive bleeding.    Don't wear contact lenses until your eyes have healed and all symptoms are gone.    Follow-up care  Follow up with your healthcare provider, or as advised.  When to seek medical advice  Call your healthcare provider right away if any of these occur:    Worsening vision    Increasing pain in the eye    Increasing swelling or redness of the eyelid    Redness spreading around the eye  [employer] last reviewed this educational content on 4/1/2020 2000-2021 [employer]. All rights reserved. This information is not intended as a substitute for professional medical care. Always follow your healthcare professional's instructions.

## 2023-02-27 ENCOUNTER — E-VISIT (OUTPATIENT)
Dept: URGENT CARE | Facility: CLINIC | Age: 28
End: 2023-02-27
Payer: COMMERCIAL

## 2023-02-27 DIAGNOSIS — B37.0 THRUSH: Primary | ICD-10-CM

## 2023-02-27 PROCEDURE — 99421 OL DIG E/M SVC 5-10 MIN: CPT | Performed by: EMERGENCY MEDICINE

## 2023-02-27 RX ORDER — CLOTRIMAZOLE 10 MG/1
10 LOZENGE ORAL
Qty: 35 LOZENGE | Refills: 0 | Status: SHIPPED | OUTPATIENT
Start: 2023-02-27 | End: 2023-03-06

## 2023-02-27 NOTE — PATIENT INSTRUCTIONS
Dear Morena Solorzano    Medication for thrust ordered.    Thanks for choosing us as your health care partner,    Tommy Preciado MD

## 2023-03-10 ENCOUNTER — E-VISIT (OUTPATIENT)
Dept: URGENT CARE | Facility: CLINIC | Age: 28
End: 2023-03-10
Payer: COMMERCIAL

## 2023-03-10 DIAGNOSIS — J02.9 SORE THROAT: Primary | ICD-10-CM

## 2023-03-10 PROCEDURE — 99421 OL DIG E/M SVC 5-10 MIN: CPT | Performed by: PHYSICIAN ASSISTANT

## 2023-03-10 NOTE — PATIENT INSTRUCTIONS
Hello,   Based on your evisit, I recommend getting the lab for strep performed. I have placed the order for this. You can get scheduled for a lab only visit via [facility] or our scheduling line [phone] ([phone]).     Thank you,   Ayden Carranza PA-C

## 2023-07-26 ASSESSMENT — PATIENT HEALTH QUESTIONNAIRE - PHQ9
SUM OF ALL RESPONSES TO PHQ QUESTIONS 1-9: 13
10. IF YOU CHECKED OFF ANY PROBLEMS, HOW DIFFICULT HAVE THESE PROBLEMS MADE IT FOR YOU TO DO YOUR WORK, TAKE CARE OF THINGS AT HOME, OR GET ALONG WITH OTHER PEOPLE: SOMEWHAT DIFFICULT
SUM OF ALL RESPONSES TO PHQ QUESTIONS 1-9: 13

## 2023-07-26 ASSESSMENT — ANXIETY QUESTIONNAIRES
GAD7 TOTAL SCORE: 2
1. FEELING NERVOUS, ANXIOUS, OR ON EDGE: NOT AT ALL
7. FEELING AFRAID AS IF SOMETHING AWFUL MIGHT HAPPEN: NOT AT ALL
5. BEING SO RESTLESS THAT IT IS HARD TO SIT STILL: NOT AT ALL
4. TROUBLE RELAXING: MORE THAN HALF THE DAYS
IF YOU CHECKED OFF ANY PROBLEMS ON THIS QUESTIONNAIRE, HOW DIFFICULT HAVE THESE PROBLEMS MADE IT FOR YOU TO DO YOUR WORK, TAKE CARE OF THINGS AT HOME, OR GET ALONG WITH OTHER PEOPLE: NOT DIFFICULT AT ALL
2. NOT BEING ABLE TO STOP OR CONTROL WORRYING: NOT AT ALL
6. BECOMING EASILY ANNOYED OR IRRITABLE: NOT AT ALL
GAD7 TOTAL SCORE: 2
3. WORRYING TOO MUCH ABOUT DIFFERENT THINGS: NOT AT ALL

## 2023-07-27 ENCOUNTER — OFFICE VISIT (OUTPATIENT)
Dept: PEDIATRICS | Facility: CLINIC | Age: 28
End: 2023-07-27
Payer: COMMERCIAL

## 2023-07-27 VITALS
OXYGEN SATURATION: 97 % | BODY MASS INDEX: 28.02 KG/M2 | HEIGHT: 61 IN | TEMPERATURE: 98.9 F | RESPIRATION RATE: 20 BRPM | WEIGHT: 148.4 LBS | SYSTOLIC BLOOD PRESSURE: 119 MMHG | DIASTOLIC BLOOD PRESSURE: 79 MMHG | HEART RATE: 88 BPM

## 2023-07-27 DIAGNOSIS — F32.1 MAJOR DEPRESSIVE DISORDER, SINGLE EPISODE, MODERATE (H): ICD-10-CM

## 2023-07-27 DIAGNOSIS — E03.9 ACQUIRED HYPOTHYROIDISM: ICD-10-CM

## 2023-07-27 DIAGNOSIS — R59.9 SWOLLEN LYMPH NODES: ICD-10-CM

## 2023-07-27 DIAGNOSIS — Z00.00 ENCOUNTER FOR MEDICAL EXAMINATION TO ESTABLISH CARE: Primary | ICD-10-CM

## 2023-07-27 PROCEDURE — 99214 OFFICE O/P EST MOD 30 MIN: CPT | Performed by: INTERNAL MEDICINE

## 2023-07-27 RX ORDER — LEVOTHYROXINE SODIUM 88 UG/1
88 TABLET ORAL DAILY
Qty: 90 TABLET | Refills: 0 | Status: SHIPPED | OUTPATIENT
Start: 2023-07-27 | End: 2023-11-01

## 2023-07-27 ASSESSMENT — PAIN SCALES - GENERAL: PAINLEVEL: MILD PAIN (2)

## 2023-07-27 ASSESSMENT — ENCOUNTER SYMPTOMS: SORE THROAT: 1

## 2023-07-27 NOTE — PATIENT INSTRUCTIONS
1. Encounter for medical examination to establish care    2. hypothyroidism  Will fill script for 88 mcg daily - please check a lab in 2 months    - levothyroxine (EUTHYROX) 88 MCG tablet; Take 1 tablet (88 mcg) by mouth daily  Dispense: 90 tablet; Refill: 0  - **TSH with free T4 reflex FUTURE 2mo; Future    3. Major depressive disorder, single episode, moderate (H)  Monitor symptoms and let me know if you would like a referral for therapy or to consider starting medications.    4. Swollen lymph nodes  Monitor - should go away within a few weeks.

## 2023-07-27 NOTE — PROGRESS NOTES
"  Assessment & Plan     Encounter for medical examination to establish care  Reviewed medical history.    Acquired hypothyroidism  Recently increased to 88 due to elevated TSH - ran out of 88 mcg so went back on her 75 mcg for the past few weeks.  Will increase back to 88 mcg and check labs in 2 months.  - levothyroxine (EUTHYROX) 88 MCG tablet; Take 1 tablet (88 mcg) by mouth daily  - **TSH with free T4 reflex FUTURE 2mo; Future    Major depressive disorder, single episode, moderate (H)  Not on meds - discussed this - pt is stable and doing well with lifestyle modifications (stress management, sleep hygiene etc).  Will continue to monitor.    Swollen lymph nodes  See PI    Patient Instructions   1. Encounter for medical examination to establish care    2. hypothyroidism  Will fill script for 88 mcg daily - please check a lab in 2 months    - levothyroxine (EUTHYROX) 88 MCG tablet; Take 1 tablet (88 mcg) by mouth daily  Dispense: 90 tablet; Refill: 0  - **TSH with free T4 reflex FUTURE 2mo; Future    3. Major depressive disorder, single episode, moderate (H)  Monitor symptoms and let me know if you would like a referral for therapy or to consider starting medications.    4. Swollen lymph nodes  Monitor - should go away within a few weeks.         BMI:   Estimated body mass index is 28.38 kg/m  as calculated from the following:    Height as of this encounter: 1.54 m (5' 0.63\").    Weight as of this encounter: 67.3 kg (148 lb 6.4 oz).   Weight management plan: Discussed healthy diet and exercise guidelines    Depression Screening Follow Up        7/26/2023    11:05 PM   PHQ   PHQ-9 Total Score 13   Q9: Thoughts of better off dead/self-harm past 2 weeks Not at all         7/26/2023    11:05 PM   Last PHQ-9   1.  Little interest or pleasure in doing things 2   2.  Feeling down, depressed, or hopeless 2   3.  Trouble falling or staying asleep, or sleeping too much 1   4.  Feeling tired or having little energy 2   5.  " "Poor appetite or overeating 2   6.  Feeling bad about yourself 2   7.  Trouble concentrating 2   8.  Moving slowly or restless 0   Q9: Thoughts of better off dead/self-harm past 2 weeks 0   PHQ-9 Total Score 13       Follow Up Actions Taken  Patient counseled, no additional follow up at this time.     Regular exercise    Liborio Alan MD  [facility] TJ Berg is a 28 year old, presenting for the following health issues:  Establish Care, Refill Request (levothyroxine (EUTHYROX) 88 MCG table), Sinus Problem (Pressure), and Pharyngitis      7/27/2023    10:36 AM   Additional Questions   Roomed by Sofia Olivares   Accompanied by N/A         7/27/2023    10:36 AM   Patient Reported Additional Medications   Patient reports taking the following new medications No       Sinus Problem   Associated symptoms include sore throat.   Pharyngitis     History of Present Illness       Hypothyroidism:     Since last visit, patient describes the following symptoms::  Anxiety    She eats 2-3 servings of fruits and vegetables daily.She consumes 2 sweetened beverage(s) daily.She exercises with enough effort to increase her heart rate 20 to 29 minutes per day.  She exercises with enough effort to increase her heart rate 3 or less days per week.   She is not taking prescribed medications regularly due to remembering to take.         Review of Systems   HENT:  Positive for sore throat.       Constitutional, HEENT, cardiovascular, pulmonary, gi and gu systems are negative, except as otherwise noted.      Objective    /79 (BP Location: Right arm, Patient Position: Sitting, Cuff Size: Adult Regular)   Pulse 88   Temp 98.9  F (37.2  C) (Oral)   Resp 20   Ht 1.54 m (5' 0.63\")   Wt 67.3 kg (148 lb 6.4 oz)   LMP 07/13/2023 (Approximate)   SpO2 97%   BMI 28.38 kg/m    Body mass index is 28.38 kg/m .  Physical Exam   GENERAL: healthy, alert and no distress  NECK: cervical adenopathy bilateral, tender " and thyroid normal to palpation  RESP: lungs clear to auscultation - no rales, rhonchi or wheezes  CV: regular rate and rhythm, normal S1 S2, no S3 or S4, no murmur, click or rub, no peripheral edema and peripheral pulses strong  ABDOMEN: soft, nontender, no hepatosplenomegaly, no masses and bowel sounds normal  MS: no gross musculoskeletal defects noted, no edema                    Answers submitted by the patient for this visit:  Patient Health Questionnaire (Submitted on 7/26/2023)  If you checked off any problems, how difficult have these problems made it for you to do your work, take care of things at home, or get along with other people?: Somewhat difficult  PHQ9 TOTAL SCORE: 13  SHERICE-7 (Submitted on 7/26/2023)  SHERICE 7 TOTAL SCORE: 2  Hypothyroid  (Submitted on 7/26/2023)  Chief Complaint: Chronic problems general questions HPI Form  Since last visit, patient describes the following symptoms:: Anxiety  General Questionnaire (Submitted on 7/26/2023)  Chief Complaint: Chronic problems general questions HPI Form  How many servings of fruits and vegetables do you eat daily?: 2-3  On average, how many sweetened beverages do you drink each day (Examples: soda, juice, sweet tea, etc.  Do NOT count diet or artificially sweetened beverages)?: 2  How many minutes a day do you exercise enough to make your heart beat faster?: 20 to 29  How many days a week do you exercise enough to make your heart beat faster?: 3 or less  How many days per week do you miss taking your medication?: 3  What makes it hard for you to take your medication every day?: remembering to take

## 2023-08-06 ENCOUNTER — HEALTH MAINTENANCE LETTER (OUTPATIENT)
Age: 28
End: 2023-08-06

## 2023-09-27 ENCOUNTER — LAB (OUTPATIENT)
Dept: LAB | Facility: CLINIC | Age: 28
End: 2023-09-27
Payer: COMMERCIAL

## 2023-09-27 DIAGNOSIS — E03.9 ACQUIRED HYPOTHYROIDISM: ICD-10-CM

## 2023-09-27 LAB — TSH SERPL DL<=0.005 MIU/L-ACNC: 3.16 UIU/ML (ref 0.3–4.2)

## 2023-09-27 PROCEDURE — 84443 ASSAY THYROID STIM HORMONE: CPT

## 2023-09-27 PROCEDURE — 36415 COLL VENOUS BLD VENIPUNCTURE: CPT

## 2023-09-28 DIAGNOSIS — E03.9 ACQUIRED HYPOTHYROIDISM: Primary | ICD-10-CM

## 2023-09-28 NOTE — RESULT ENCOUNTER NOTE
Dear Morena,    Your lab results are normal.  At this time, I do not recommend any changes to your current thyroid medications.  I do recommend we check this level one more time in about 6 months (then, if normal, we can resume checking yearly at your physical).  I have placed the order - please schedule another lab-only visit at your convenience (around March-April 2024).    Please feel free to call with any questions.  Otherwise, we can discuss further at your next appointment.    Sincerely,    Liborio Alan MD

## 2023-11-01 ENCOUNTER — MYC REFILL (OUTPATIENT)
Dept: PEDIATRICS | Facility: CLINIC | Age: 28
End: 2023-11-01
Payer: COMMERCIAL

## 2023-11-01 DIAGNOSIS — E03.9 ACQUIRED HYPOTHYROIDISM: ICD-10-CM

## 2023-11-01 RX ORDER — LEVOTHYROXINE SODIUM 88 UG/1
88 TABLET ORAL DAILY
Qty: 90 TABLET | Refills: 0 | Status: SHIPPED | OUTPATIENT
Start: 2023-11-01 | End: 2024-02-05

## 2023-11-24 ENCOUNTER — E-VISIT (OUTPATIENT)
Dept: PEDIATRICS | Facility: CLINIC | Age: 28
End: 2023-11-24
Payer: COMMERCIAL

## 2023-11-24 DIAGNOSIS — J06.9 VIRAL URI WITH COUGH: Primary | ICD-10-CM

## 2023-11-24 PROCEDURE — 99421 OL DIG E/M SVC 5-10 MIN: CPT | Performed by: INTERNAL MEDICINE

## 2023-11-24 RX ORDER — GUAIFENESIN 1200 MG/1
1200 TABLET, EXTENDED RELEASE ORAL 2 TIMES DAILY
Qty: 60 TABLET | Refills: 0 | Status: SHIPPED | OUTPATIENT
Start: 2023-11-24 | End: 2024-02-26

## 2023-11-24 RX ORDER — BENZONATATE 100 MG/1
100 CAPSULE ORAL 3 TIMES DAILY PRN
Qty: 30 CAPSULE | Refills: 0 | Status: SHIPPED | OUTPATIENT
Start: 2023-11-24 | End: 2024-02-26

## 2023-11-24 RX ORDER — DEXTROMETHORPHAN POLISTIREX 30 MG/5ML
10 SUSPENSION ORAL 2 TIMES DAILY PRN
Qty: 89 ML | Refills: 0 | Status: SHIPPED | OUTPATIENT
Start: 2023-11-24 | End: 2024-02-26

## 2023-11-24 NOTE — PATIENT INSTRUCTIONS
Bronchitis: Care Instructions  Overview     Bronchitis is inflammation of the bronchial tubes, which carry air to the lungs. The tubes swell and produce mucus, or phlegm. The mucus and inflamed bronchial tubes make you cough. You may have trouble breathing.  Most cases of bronchitis are caused by viruses like those that cause colds. Antibiotics most often do not help and they may cause harm.  Bronchitis usually develops rapidly and lasts about 2 to 3 weeks in people who are otherwise healthy.  Follow-up care is a key part of your treatment and safety. Be sure to make and go to all appointments, and call your doctor if you are having problems. It's also a good idea to know your test results and keep a list of the medicines you take.  How can you care for yourself at home?  Take all medicines exactly as prescribed. Call your doctor if you think you are having a problem with your medicine.  Get some extra rest.  Take an over-the-counter pain medicine, such as acetaminophen (Tylenol), ibuprofen (Advil, Motrin), or naproxen (Aleve) to reduce fever and relieve body aches. Read and follow all instructions on the label.  Do not take two or more pain medicines at the same time unless the doctor told you to. Many pain medicines have acetaminophen, which is Tylenol. Too much acetaminophen (Tylenol) can be harmful.  Take an over-the-counter cough medicine to help quiet a dry, hacking cough so that you can sleep. Avoid cough medicines that have more than one active ingredient. Read and follow all instructions on the label.  Do not smoke. Smoking can make bronchitis worse. If you need help quitting, talk to your doctor about stop-smoking programs and medicines. These can increase your chances of quitting for good.  When should you call for help?   Call 911 anytime you think you may need emergency care. For example, call if:    You have severe trouble breathing.   Call your doctor now or seek immediate medical care if:    You have  "new or worse trouble breathing.     You cough up dark brown or bloody mucus (sputum).     You have a new or higher fever.     You have a new rash.   Watch closely for changes in your health, and be sure to contact your doctor if:    You cough more deeply or more often, especially if you notice more mucus or a change in the color of your mucus.     You are not getting better as expected.   Where can you learn more?  Go to https://www.[employer].net/patiented  Enter H333 in the search box to learn more about \"Bronchitis: Care Instructions.\"  Current as of: November 13, 2022               Content Version: 13.8    8861-5808 [employer].   Care instructions adapted under license by your healthcare professional. If you have questions about a medical condition or this instruction, always ask your healthcare professional. [employer] disclaims any warranty or liability for your use of this information.      Dear Morena Solorzano    After reviewing your responses, I've been able to diagnose you with \"Bronchitis\" which is a common infection of your lungs that is nearly always caused by a virus. The virus causes swelling and irritation of the air passages of your lungs which leads to cough. The illness spreads from your nose and throat to your windpipe and airways. It is often called a \"chest cold\" and can last up to 2 weeks, but is not a serious illness. Exposure to cigarette smoke usually makes this significantly worse.      To treat bronchitis, the main thing to do is drink lots of fluids and rest. Cough medications over-the-counter such as mucinex, robitussin or \"cold and sinus\" medications can be helpful. Ibuprofen and Tylenol also help with fevers or aching feelings that you often have with this kind of illness. Do not take ibuprofen if you have kidney disease, stomach ulcers or allergy to aspirin.     Bronchitis is most often highly contagious as viruses are spread through the air or touch. Avoid " contact with others who may become infected, particularly children, the elderly and those whose immune systems might be weak.     If your symptoms worsen, you develop chest pain or shortness of breath, fevers over 101, or are not improving in 5 days, please contact your primary care provider for an appointment or visit any of our convenient Walk-in Care or Urgent Care Centers to be seen which can be found on our website here.    Thanks again for choosing us as your health care partner,    Liborio Alan MD  Morena,    Looks like you caught the nasty fall virus everyone else (myself included) is getting.  This virus seems to be causing a bad chest cold with cough - it lasts around 2-3 weeks.  If you haven't already, I recommend we rule out flu and COVID (I ordered the tests - please schedule a lab appointment for these).    View your full visit summary for details by clicking on the link below. You can schedule a lab only appointment right here in [facility], or by calling [phone].    For symptoms management, I recommend tylenol or ibuprofen as needed as needed.  I sent in a script for tessalon (a cough suppressant), but I also find dextromethorphan and mucinex just as helpful (I did order these, but your insurance may make you pay out of pocket as they are available over the counter).  It is safe to take all of these in combination, just be mindful to stay within the daily limits for all of them.    You will receive your lab results and next steps via [facility] when the lab results return.    If you have shortness of breath, wheezing, or prolonged fevers, please schedule in clinic appointment for further evaluation (sometimes a virus can turn into a secondary bacterial pneumonia that would require antibiotics, however we will need to see you in clinic to evaluate this).    Sincerely,  Liborio Alan MD

## 2023-12-06 ENCOUNTER — MYC MEDICAL ADVICE (OUTPATIENT)
Dept: PEDIATRICS | Facility: CLINIC | Age: 28
End: 2023-12-06
Payer: COMMERCIAL

## 2023-12-07 ENCOUNTER — NURSE TRIAGE (OUTPATIENT)
Dept: PEDIATRICS | Facility: CLINIC | Age: 28
End: 2023-12-07

## 2023-12-07 ENCOUNTER — ANCILLARY PROCEDURE (OUTPATIENT)
Dept: GENERAL RADIOLOGY | Facility: CLINIC | Age: 28
End: 2023-12-07
Attending: PHYSICIAN ASSISTANT
Payer: COMMERCIAL

## 2023-12-07 DIAGNOSIS — R07.81 RIB PAIN: ICD-10-CM

## 2023-12-07 DIAGNOSIS — R05.1 ACUTE COUGH: ICD-10-CM

## 2023-12-07 LAB
FLUAV AG SPEC QL IA: NEGATIVE
FLUBV AG SPEC QL IA: NEGATIVE

## 2023-12-07 PROCEDURE — 87804 INFLUENZA ASSAY W/OPTIC: CPT | Performed by: INTERNAL MEDICINE

## 2023-12-07 PROCEDURE — 71046 X-RAY EXAM CHEST 2 VIEWS: CPT | Mod: TC | Performed by: RADIOLOGY

## 2023-12-07 NOTE — TELEPHONE ENCOUNTER
Nurse Triage SBAR    Is this a 2nd Level Triage? YES, LICENSED PRACTITIONER REVIEW IS REQUIRED    Situation:   - Patient presented to Urgent Care   - Triaged the patient alongside JEANNETTE Preston     Background:   - Patient has an appointment scheduled with Libia Johnson PA-C today (12/7/2023)     Appointments in Next Year      Dec 07, 2023  3:00 PM  (Arrive by 2:40 PM)  Provider Visit with Libia Johnson PA-C  [facility] ([facility] [place] ) [phone]     Assessment:   - Patient complaining of an ongoing cough for about 3.5 weeks   - Patient states that she is now experiencing some chest congestion and left sided rib pain   - Patient reports that she has coughed up yellow, white, and green colored phlegm   - Patient denies difficulty breathing or shortness of breath   - Patient complains of wheezing at night when laying flat     BP: 138/79  HR: 73   O2 Saturation: 100% RA   T: 99.1     Protocol Recommended Disposition:   No disposition on file.    Recommendation:   - Huddled with Libia Johnson PA-C (POD) who states that she will see the patient at this time rather than her appointment time of 3 PM   - Informed the patient, patient verbalized understanding and agrees with the plan      Huddled with POD, Libia Johnson PA-C.    Reason for Disposition   Change in color of sputum (e.g., from white to yellow-green sputum)    Additional Information   Negative: SEVERE difficulty breathing (e.g., struggling for each breath, speaks in single words)   Negative: Lips or face are bluish now and persists when not coughing   Negative: Sounds like a life-threatening emergency to the triager   Negative: Chest pain is main symptom   Negative: Cough and < 3 weeks duration   Negative: Previous asthma attacks and this feels like an asthma attack   Negative: MODERATE difficulty breathing (e.g., speaks in phrases, SOB even at rest, pulse 100-120) and still present when not coughing    "Negative: Chest pain  (Exception: MILD central chest pain, present only when coughing.)   Negative: Increasing difficulty breathing and always has some difficulty breathing   Negative: Patient sounds very sick or weak to the triager   Negative: MILD difficulty breathing (e.g., minimal/no SOB at rest, SOB with walking, pulse < 100) and still present when not coughing  (Exception: No change from usual, chronic shortness of breath.)   Negative: Coughed up blood and > 1 tablespoon (15 ml)  (Exception: Blood-tinged sputum.)   Negative: Fever > 103 F (39.4 C)   Negative: Fever > 101 F (38.3 C) and age > 60 years   Negative: Fever > 100.0 F (37.8 C) and bedridden (e.g., CVA, chronic illness, recovering from surgery)   Negative: Fever > 100.0 F (37.8 C) and diabetes mellitus or weak immune system (e.g., HIV positive, cancer chemo, splenectomy, organ transplant, chronic steroids)   Negative: SEVERE coughing spells (e.g., whooping sound after coughing, vomiting after coughing)   Negative: Continuous (nonstop) coughing interferes with work or school and no improvement using cough treatment per Care Advice   Negative: Fever present > 3 days (72 hours)   Negative: Coughing up peyton-colored sputum    Answer Assessment - Initial Assessment Questions  1. ONSET: \"When did the cough begin?\"       About 3.5 weeks ago.   2. SEVERITY: \"How bad is the cough today?\"       Coughing constant for about 3.5 weeks.   3. SPUTUM: \"Describe the color of your sputum\" (none, dry cough; clear, white, yellow, green)      Some sputum, clear, yellow and green in color.   4. HEMOPTYSIS: \"Are you coughing up any blood?\" If so ask: \"How much?\" (flecks, streaks, tablespoons, etc.)      Denies.   5. DIFFICULTY BREATHING: \"Are you having difficulty breathing?\" If Yes, ask: \"How bad is it?\" (e.g., mild, moderate, severe)     - MILD: No SOB at rest, mild SOB with walking, speaks normally in sentences, can lie down, no retractions, pulse < 100.     - MODERATE: " "SOB at rest, SOB with minimal exertion and prefers to sit, cannot lie down flat, speaks in phrases, mild retractions, audible wheezing, pulse 100-120.     - SEVERE: Very SOB at rest, speaks in single words, struggling to breathe, sitting hunched forward, retractions, pulse > 120       Denies.   6. FEVER: \"Do you have a fever?\" If Yes, ask: \"What is your temperature, how was it measured, and when did it start?\"      States that she had a low grade fever at the beginning. 99.1 today (12/7/2023).   7. CARDIAC HISTORY: \"Do you have any history of heart disease?\" (e.g., heart attack, congestive heart failure)       Denies.   8. LUNG HISTORY: \"Do you have any history of lung disease?\"  (e.g., pulmonary embolus, asthma, emphysema)      Denies.   9. PE RISK FACTORS: \"Do you have a history of blood clots?\" (or: recent major surgery, recent prolonged travel, bedridden)      Denies.   10. OTHER SYMPTOMS: \"Do you have any other symptoms?\" (e.g., runny nose, wheezing, chest pain)        Wheezing at night, denies chest pain, reports chest congestion. Runny nose intermittently.   11. PREGNANCY: \"Is there any chance you are pregnant?\" \"When was your last menstrual period?\"        N/A.   12. TRAVEL: \"Have you traveled out of the country in the last month?\" (e.g., travel history, exposures)        Denies.    Protocols used: Cough - Chronic-A-[place]    Appointments in Next Year      Dec 07, 2023  9:30 AM  Testing Only with [facility] LAB  [facility] ([facility] ) [phone]     Dec 07, 2023  3:00 PM  (Arrive by 2:40 PM)  Provider Visit with Libia Johnson PA-C  [facility] ([facility] ) [phone]     Monie MELGAR RN   [facility]     "

## 2023-12-07 NOTE — TELEPHONE ENCOUNTER
Provider Recommendation Follow Up:   Reached patient/caregiver. Informed of provider's recommendations. Patient verbalized understanding and agrees with the plan.     Monie MELGAR RN   [facility]

## 2023-12-08 NOTE — TELEPHONE ENCOUNTER
Pt had an OV 12/7/23  [facility] message is from 12/6/23    Closing encounter    Robert Nair RN on 12/8/2023 at 1:29 PM

## 2024-02-05 ENCOUNTER — MYC REFILL (OUTPATIENT)
Dept: PEDIATRICS | Facility: CLINIC | Age: 29
End: 2024-02-05
Payer: COMMERCIAL

## 2024-02-05 DIAGNOSIS — E03.9 ACQUIRED HYPOTHYROIDISM: ICD-10-CM

## 2024-02-05 RX ORDER — LEVOTHYROXINE SODIUM 88 UG/1
88 TABLET ORAL DAILY
Qty: 90 TABLET | Refills: 0 | Status: SHIPPED | OUTPATIENT
Start: 2024-02-05 | End: 2024-05-08

## 2024-02-23 ENCOUNTER — TELEPHONE (OUTPATIENT)
Dept: PEDIATRICS | Facility: CLINIC | Age: 29
End: 2024-02-23
Payer: COMMERCIAL

## 2024-02-26 ENCOUNTER — OFFICE VISIT (OUTPATIENT)
Dept: PEDIATRICS | Facility: CLINIC | Age: 29
End: 2024-02-26
Payer: OTHER MISCELLANEOUS

## 2024-02-26 ENCOUNTER — ANCILLARY PROCEDURE (OUTPATIENT)
Dept: GENERAL RADIOLOGY | Facility: CLINIC | Age: 29
End: 2024-02-26
Attending: NURSE PRACTITIONER
Payer: COMMERCIAL

## 2024-02-26 VITALS
HEART RATE: 82 BPM | RESPIRATION RATE: 16 BRPM | BODY MASS INDEX: 31.22 KG/M2 | HEIGHT: 60 IN | SYSTOLIC BLOOD PRESSURE: 124 MMHG | DIASTOLIC BLOOD PRESSURE: 70 MMHG | OXYGEN SATURATION: 100 % | TEMPERATURE: 97.6 F | WEIGHT: 159 LBS

## 2024-02-26 DIAGNOSIS — M54.42 ACUTE LEFT-SIDED LOW BACK PAIN WITH LEFT-SIDED SCIATICA: ICD-10-CM
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
## 2024-02-26 PROBLEM — F32.1 MAJOR DEPRESSIVE DISORDER, SINGLE EPISODE, MODERATE (H): Status: RESOLVED | Noted: 2023-07-27 | Resolved: 2024-02-26

## 2024-02-26 PROCEDURE — 72100 X-RAY EXAM L-S SPINE 2/3 VWS: CPT | Mod: TC | Performed by: RADIOLOGY

## 2024-02-26 PROCEDURE — 99213 OFFICE O/P EST LOW 20 MIN: CPT | Performed by: NURSE PRACTITIONER

## 2024-02-26 RX ORDER — CYCLOBENZAPRINE HCL 5 MG
5 TABLET ORAL
Qty: 10 TABLET | Refills: 0 | Status: SHIPPED | OUTPATIENT
Start: 2024-02-26 | End: 2024-03-01

## 2024-02-26 ASSESSMENT — ENCOUNTER SYMPTOMS: BACK PAIN: 1

## 2024-02-26 ASSESSMENT — PAIN SCALES - GENERAL: PAINLEVEL: MODERATE PAIN (4)

## 2024-02-26 NOTE — PATIENT INSTRUCTIONS
It was nice seeing you today.    Please let me know if you have any questions regarding today's visit!    Take care,    PACO Thomas DNP  Family Medicine

## 2024-02-26 NOTE — PROGRESS NOTES
Assessment & Plan     Work related injury  Acute left-sided low back pain with left-sided sciatica  Paperwork completed for patient today.   Follow-up in 6 weeks.  Referral given for PT  Xray ordered to rule out acute injury.  - Physical Therapy  Referral; Future  - XR Lumbar Spine 2/3 Views  - cyclobenzaprine (FLEXERIL) 5 MG tablet; Take 1 tablet (5 mg) by mouth nightly as needed for muscle spasms        Ricky Berg is a 29 year old, presenting for the following health issues:  Back Pain        2/26/2024     1:14 PM   Additional Questions   Roomed by Melanie ANTUNEZ   Accompanied by Self         2/26/2024     1:14 PM   Patient Reported Additional Medications   Patient reports taking the following new medications n/a     Back Pain     History of Present Illness       Back Pain:  She presents for follow up of back pain. Patient's back pain is a new problem.    Original cause of back pain: lifting  First noticed back pain: 1-4 weeks ago  Patient feels back pain: dailyLocation of back pain:  Left lower back and left hip  Description of back pain: dull ache, fullness and gnawing  Back pain spreads: nowhere    Since patient first noticed back pain, pain is: gradually improving  Does back pain interfere with her job:  Yes  On a scale of 1-10 (10 being the worst), patient describes pain as:  4  What makes back pain worse: bending, coughing, certain positions, sitting, twisting and other   Acupuncture: not tried  Acetaminophen: not helpful  Activity or exercise: helpful  Chiropractor:  Not tried  Cold: helpful  Heat: helpful  Massage: helpful  Muscle relaxants: not tried  NSAIDS: helpful  Opioids: not tried  Physical Therapy: not tried  Rest: helpful  Steroid Injection: not tried  Stretching: helpful  Surgery: not tried  TENS unit: not tried  Topical pain relievers: helpful  Other healthcare providers patient is seeing for back pain: None    She eats 2-3 servings of fruits and vegetables daily.She consumes 1  sweetened beverage(s) daily.She exercises with enough effort to increase her heart rate 20 to 29 minutes per day.  She exercises with enough effort to increase her heart rate 3 or less days per week.   She is taking medications regularly.     Pt is here for a back injury at work 2/2/24.   Was lifting trays at work and injured her back.    Has been working.  Bending and twisting worsens pain.  Pain worsens with standing on feet for prolonged standing.  If she feels the pain she rests which does help.  Pain is improving.  Sciatica down left leg.  No loss of bowel or bladder function.    Depression:  Depression has resolved.  No recurrence.    Review of Systems  Constitutional, HEENT, cardiovascular, pulmonary, gi and gu systems are negative, except as otherwise noted.      Objective    /70   Pulse 82   Temp 97.6  F (36.4  C) (Tympanic)   Resp 16   Ht 1.524 m (5')   Wt 72.1 kg (159 lb)   LMP 02/07/2024   SpO2 100%   BMI 31.05 kg/m    Body mass index is 31.05 kg/m .      Physical Exam   GENERAL: alert and no distress  RESP: lungs clear to auscultation - no rales, rhonchi or wheezes  CV: regular rate and rhythm, normal S1 S2, no S3 or S4, no murmur, click or rub, no peripheral edema  Comprehensive back pain exam:  Tenderness of left low back, Range of motion not limited by pain, and Straight leg positive on  left, indicating possible ipsilateral radiculopathy  PSYCH: mentation appears normal, affect normal/bright          Signed Electronically by: Keyonna Thomas NP

## 2024-02-29 ENCOUNTER — TELEPHONE (OUTPATIENT)
Dept: PEDIATRICS | Facility: CLINIC | Age: 29
End: 2024-02-29
Payer: COMMERCIAL
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
## 2024-02-29 DIAGNOSIS — M54.42 ACUTE LEFT-SIDED LOW BACK PAIN WITH LEFT-SIDED SCIATICA: ICD-10-CM

## 2024-02-29 NOTE — TELEPHONE ENCOUNTER
RN called and spoke with patient.     Seen OV with Julie Monday.       S-(situation): worsening pain    B-(background): Seen OV w Keyonna Monday 2/26/24    A-(assessment): Patient described lingering pain, with 6-7/10 when she sits/bounces/steps on L foot, bends over.   Interrupting sleep, daily activities.   Effecting mid to lower back.   Has been using ice and heat alternating. Minimal relief.    R-(recommendations): Would you like to see patient again? Within what timeframe? Further pain medication? Please advise!     Instructed pt to call back if new or worsening symptoms, advised red flags for ER.Patient was given an opportunity to ask questions, verbalized understanding of plan, and is agreeable.    Forwarding to provider for review!  Kary RODRIGUEZ RN on 2/29/2024 at 4:42 PM

## 2024-02-29 NOTE — TELEPHONE ENCOUNTER
Pt states was seen Monday for back pain but has now gotten  a lot worse, wonder what next steps would be and how to proceed, please advise asap [phone]  if she misses that line please try work [phone]  cannot even put socks on or do daily tasks, when she sits is painful not sure if to do scheduled pt when in so much pain.

## 2024-03-01 ENCOUNTER — OFFICE VISIT (OUTPATIENT)
Dept: PEDIATRICS | Facility: CLINIC | Age: 29
End: 2024-03-01
Payer: OTHER MISCELLANEOUS

## 2024-03-01 VITALS
DIASTOLIC BLOOD PRESSURE: 80 MMHG | BODY MASS INDEX: 31.49 KG/M2 | HEIGHT: 60 IN | TEMPERATURE: 98.8 F | RESPIRATION RATE: 16 BRPM | HEART RATE: 101 BPM | OXYGEN SATURATION: 97 % | SYSTOLIC BLOOD PRESSURE: 124 MMHG | WEIGHT: 160.4 LBS

## 2024-03-01 DIAGNOSIS — M54.42 ACUTE LEFT-SIDED LOW BACK PAIN WITH LEFT-SIDED SCIATICA: Primary | ICD-10-CM
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
## 2024-03-01 PROCEDURE — 99214 OFFICE O/P EST MOD 30 MIN: CPT | Performed by: PHYSICIAN ASSISTANT

## 2024-03-01 RX ORDER — METHYLPREDNISOLONE 4 MG
TABLET, DOSE PACK ORAL
Qty: 21 TABLET | Refills: 0 | Status: SHIPPED | OUTPATIENT
Start: 2024-03-01

## 2024-03-01 RX ORDER — CYCLOBENZAPRINE HCL 10 MG
10 TABLET ORAL 3 TIMES DAILY PRN
Qty: 15 TABLET | Refills: 0 | Status: SHIPPED | OUTPATIENT
Start: 2024-03-01

## 2024-03-01 ASSESSMENT — PAIN SCALES - PAIN ENJOYMENT GENERAL ACTIVITY SCALE (PEG)
PEG_TOTALSCORE: 5.67
INTERFERED_ENJOYMENT_LIFE: 4
INTERFERED_GENERAL_ACTIVITY: 7
AVG_PAIN_PASTWEEK: 6

## 2024-03-01 ASSESSMENT — ENCOUNTER SYMPTOMS: BACK PAIN: 1

## 2024-03-01 ASSESSMENT — PAIN SCALES - GENERAL: PAINLEVEL: MODERATE PAIN (5)

## 2024-03-01 NOTE — PROGRESS NOTES
Assessment & Plan     Acute left-sided low back pain with left-sided sciatica  Begin medrol dose john. Heat, gentle stretches. Work excuse given.   Patient may begin flexeril PRN.  - cyclobenzaprine (FLEXERIL) 10 MG tablet; Take 1 tablet (10 mg) by mouth 3 times daily as needed for muscle spasms  - methylPREDNISolone (MEDROL DOSEPAK) 4 MG tablet therapy pack; Follow Package Directions    Work related injury    Ricky Berg is a 29 year old, presenting for the following health issues:  Back Pain      Back Pain     History of Present Illness       Back Pain:  She presents for follow up of back pain. Patient's back pain is a new problem.    Original cause of back pain: lifting  First noticed back pain: 1-4 weeks ago  Patient feels back pain: dailyLocation of back pain:  Left lower back and left hip  Description of back pain: dull ache, fullness and gnawing  Back pain spreads: nowhere    Since patient first noticed back pain, pain is: gradually improving  Does back pain interfere with her job:  Yes  On a scale of 1-10 (10 being the worst), patient describes pain as:  4  What makes back pain worse: bending, coughing, certain positions, sitting, twisting and other   Acupuncture: not tried  Acetaminophen: not helpful  Activity or exercise: helpful  Chiropractor:  Not tried  Cold: helpful  Heat: helpful  Massage: helpful  Muscle relaxants: not tried  NSAIDS: helpful  Opioids: not tried  Physical Therapy: not tried  Rest: helpful  Steroid Injection: not tried  Stretching: helpful  Surgery: not tried  TENS unit: not tried  Topical pain relievers: helpful  Other healthcare providers patient is seeing for back pain: None    She eats 2-3 servings of fruits and vegetables daily.She consumes 1 sweetened beverage(s) daily.She exercises with enough effort to increase her heart rate 20 to 29 minutes per day.  She exercises with enough effort to increase her heart rate 3 or less days per week.   She is taking medications  regularly.     Pt is here for a back injury at work 2/2/24. Patient works for [employer].   Was lifting trays at work and injured her back.    Has been working.  Bending and twisting worsens pain.  Pain worsens with standing on feet for prolonged standing.  If she feels the pain she rests which does help.  Pain is improving.  Sciatica down left leg.  No loss of bowel or bladder function.    Pain History:  When did you first notice your pain? About 4 weeks ago    Have you seen anyone else for your pain? Yes - 02/26/2024  How has your pain affected your ability to work? Can work part time without limitations   Where in your body do you have pain? Back Pain  Onset/Duration: about 4 weeks ago   Description:   Location of pain: low back left  Character of pain: dull ache, gnawing, and fullness  Pain radiation: none  New numbness or weakness in legs, not attributed to pain: YES- some cramping feeling and tingling on the left leg   Intensity: Currently 4/10  Progression of Symptoms: worsening  History:   Specific cause: lifting  Pain interferes with job: YES  History of back problems: no prior back problems  Any previous MRI or X-rays: Yes- at [place].  Date 02/26/2024  Sees a specialist for back pain: is scheduled with PT for 03/04/2024  Alleviating factors:   Improved by: cold, heat, NSAIDs, and stretch    Precipitating factors:  Worsened by: Bending, Sitting, Coughing, and certain positions   Therapies tried and outcome: cold and heat- some relief with it.     Review of Systems  Constitutional, HEENT, cardiovascular, pulmonary, gi and gu systems are negative, except as otherwise noted.      Objective    /80 (BP Location: Right arm, Patient Position: Sitting, Cuff Size: Adult Regular)   Pulse 101   Temp 98.8  F (37.1  C) (Tympanic)   Resp 16   Ht 1.524 m (5')   Wt 72.8 kg (160 lb 6.4 oz)   LMP 02/07/2024   SpO2 97%   BMI 31.33 kg/m    Body mass index is 31.33 kg/m .  Physical Exam   GENERAL: alert and no  distress  NECK: no adenopathy, no asymmetry, masses, or scars  RESP: lungs clear to auscultation - no rales, rhonchi or wheezes  CV: regular rate and rhythm, normal S1 S2, no S3 or S4, no murmur, click or rub, no peripheral edema  ABDOMEN: soft, nontender, no hepatosplenomegaly, no masses and bowel sounds normal  ORTHO: Lumber/Thoracic Spine Exam: Tender:  left para lumbar muscles  NRange of Motion:  pain. lumbar flexion  decreased, left lateral lumbar bending  decreased, right lateral lumbar bending  decreased  Strength:  full      No results found for any visits on 03/01/24.        Signed Electronically by: Caden Blackmon PA-C

## 2024-03-01 NOTE — TELEPHONE ENCOUNTER
RN called and spoke with patient. Scheduled with Cristy 3/1/24 1000 with 0940 arrival. Instructed pt to call back if new or worsening symptoms.      Keyonna,     Patient is agreeable to ortho/spine referral.   PT is scheduled for Monday, she is worried about completing it with the pain she has been having. I informed patient to bring that concern up during her appt today with Cristy to talk about a plan.       Kary RODRIGUEZ RN on 3/1/2024 at 7:58 AM

## 2024-03-01 NOTE — TELEPHONE ENCOUNTER
Can we see if someone has a same day tomorrow (Friday)?    I sent her a [employer] message re: if she wanted a referral to orthopedics/spine and also if she was able to schedule PT yet.      Keyonna

## 2024-05-01 ENCOUNTER — VIRTUAL VISIT (OUTPATIENT)
Dept: PEDIATRICS | Facility: CLINIC | Age: 29
End: 2024-05-01
Payer: OTHER MISCELLANEOUS
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
## 2024-05-01 DIAGNOSIS — M54.42 ACUTE LEFT-SIDED LOW BACK PAIN WITH LEFT-SIDED SCIATICA: ICD-10-CM

## 2024-05-01 PROCEDURE — 99212 OFFICE O/P EST SF 10 MIN: CPT | Mod: 95 | Performed by: NURSE PRACTITIONER

## 2024-05-01 PROCEDURE — G2211 COMPLEX E/M VISIT ADD ON: HCPCS | Mod: 95 | Performed by: NURSE PRACTITIONER

## 2024-05-01 NOTE — PROGRESS NOTES
"Morena is a 29 year old who is being evaluated via a billable video visit.        Assessment & Plan     Work related injury  Acute left-sided low back pain with left-sided sciatica  Symptoms have improved.  Still feels pain with \"deep\" bending down.  Continue light duty/lifting restrictions at work as needed.  Follow-up as needed.      Subjective   Morena is a 29 year old, presenting for the following health issues:    No chief complaint on file.    HPI     Feeling much better.  Back pain is still present when she bends over.  PT has been helpful.  Working on using her core when she lifts which is helping.  PT ended 2 weeks ago.  Has been doing exercises at home.  Has been doing full time work.  Does restrictions/light duty.  Does not need a note for work at this time.  Patient would like to close out workman's compensation claim with HR.    Pt is here for a back injury at work 2/2/24. Patient works at [employer].  Was lifting trays at work and injured her back.    Bending and twisting worsens pain.  This has resolved.  Pain is improving.  Sciatica down left leg.  No loss of bowel or bladder function.    Review of Systems  Constitutional, HEENT, cardiovascular, pulmonary, gi and gu systems are negative, except as otherwise noted.      Objective       Vitals:  No vitals were obtained today due to virtual visit.    Physical Exam   GENERAL: alert and no distress  EYES: Eyes grossly normal to inspection.  No discharge or erythema, or obvious scleral/conjunctival abnormalities.  RESP: No audible wheeze, cough, or visible cyanosis.    SKIN: Visible skin clear. No significant rash, abnormal pigmentation or lesions.  NEURO: Cranial nerves grossly intact.  Mentation and speech appropriate for age.  PSYCH: Appropriate affect, tone, and pace of words        Video-Visit Details    Type of service:  Video Visit   Originating Location (pt. Location): Home    Distant Location (provider location):  On-site  Platform used for Video " Visit: Lisa  Signed Electronically by: Keyonna Thomas NP

## 2024-05-08 ENCOUNTER — MYC REFILL (OUTPATIENT)
Dept: PEDIATRICS | Facility: CLINIC | Age: 29
End: 2024-05-08
Payer: COMMERCIAL

## 2024-05-08 DIAGNOSIS — E03.9 ACQUIRED HYPOTHYROIDISM: ICD-10-CM

## 2024-05-08 RX ORDER — LEVOTHYROXINE SODIUM 88 UG/1
88 TABLET ORAL DAILY
Qty: 90 TABLET | Refills: 0 | Status: SHIPPED | OUTPATIENT
Start: 2024-05-08 | End: 2024-08-08

## 2024-05-14 ENCOUNTER — OFFICE VISIT (OUTPATIENT)
Dept: PEDIATRICS | Facility: CLINIC | Age: 29
End: 2024-05-14
Payer: COMMERCIAL

## 2024-05-14 ENCOUNTER — E-VISIT (OUTPATIENT)
Dept: URGENT CARE | Facility: CLINIC | Age: 29
End: 2024-05-14
Payer: COMMERCIAL

## 2024-05-14 VITALS
WEIGHT: 158.9 LBS | OXYGEN SATURATION: 97 % | TEMPERATURE: 100 F | HEART RATE: 121 BPM | DIASTOLIC BLOOD PRESSURE: 75 MMHG | SYSTOLIC BLOOD PRESSURE: 134 MMHG | BODY MASS INDEX: 31.03 KG/M2 | RESPIRATION RATE: 20 BRPM

## 2024-05-14 DIAGNOSIS — R50.9 FEVER, UNSPECIFIED FEVER CAUSE: ICD-10-CM

## 2024-05-14 DIAGNOSIS — J02.9 PHARYNGITIS, UNSPECIFIED ETIOLOGY: Primary | ICD-10-CM

## 2024-05-14 LAB
DEPRECATED S PYO AG THROAT QL EIA: NEGATIVE
GROUP A STREP BY PCR: NOT DETECTED
SARS-COV-2 RNA RESP QL NAA+PROBE: NEGATIVE

## 2024-05-14 PROCEDURE — 87651 STREP A DNA AMP PROBE: CPT | Performed by: PHYSICIAN ASSISTANT

## 2024-05-14 PROCEDURE — 99207 PR NON-BILLABLE SERV PER CHARTING: CPT | Performed by: EMERGENCY MEDICINE

## 2024-05-14 PROCEDURE — 87635 SARS-COV-2 COVID-19 AMP PRB: CPT | Performed by: PHYSICIAN ASSISTANT

## 2024-05-14 PROCEDURE — 99213 OFFICE O/P EST LOW 20 MIN: CPT | Performed by: PHYSICIAN ASSISTANT

## 2024-05-14 ASSESSMENT — PATIENT HEALTH QUESTIONNAIRE - PHQ9
10. IF YOU CHECKED OFF ANY PROBLEMS, HOW DIFFICULT HAVE THESE PROBLEMS MADE IT FOR YOU TO DO YOUR WORK, TAKE CARE OF THINGS AT HOME, OR GET ALONG WITH OTHER PEOPLE: NOT DIFFICULT AT ALL
SUM OF ALL RESPONSES TO PHQ QUESTIONS 1-9: 5
SUM OF ALL RESPONSES TO PHQ QUESTIONS 1-9: 5

## 2024-05-14 ASSESSMENT — PAIN SCALES - GENERAL: PAINLEVEL: MODERATE PAIN (4)

## 2024-05-14 NOTE — PATIENT INSTRUCTIONS
Dear Morena Solorzano,    We are sorry you are not feeling well. Based on the responses you provided, it is recommended that you be seen in-person in urgent care so we can better evaluate your symptoms. Please click here to find the nearest urgent care location to you.   You will not be charged for this Visit. Thank you for trusting us with your care.    Tommy Preciado MD

## 2024-05-14 NOTE — PROGRESS NOTES
"  Assessment & Plan     Pharyngitis, unspecified etiology  TC pending. Continue with symptomatic treatment.   - Symptomatic COVID-19 Virus (Coronavirus) by PCR Nasopharyngeal; Future  - Streptococcus A Rapid Screen w/Reflex to PCR - Clinic Collect  - Symptomatic COVID-19 Virus (Coronavirus) by PCR Nose  - Group A Streptococcus PCR Throat Swab    Fever, unspecified fever cause  - Symptomatic COVID-19 Virus (Coronavirus) by PCR Nasopharyngeal; Future  - Streptococcus A Rapid Screen w/Reflex to PCR - Clinic Collect  - Symptomatic COVID-19 Virus (Coronavirus) by PCR Nose  - Group A Streptococcus PCR Throat Swab      Ricky Berg is a 29 year old, presenting for the following health issues:      History of Present Illness       Headaches:   Since the patient's last clinic visit, headaches are: no change  The patient is getting headaches:  1  She is not able to do normal daily activities when she has a migraine.  The patient is taking the following rescue/relief medications:  Tylenol   Patient states \"I get only a small amount of relief\" from the rescue/relief medications.   The patient is taking the following medications to prevent migraines:  No medications to prevent migraines  In the past 4 weeks, the patient has gone to an Urgent Care or Emergency Room 0 times times due to headaches.    Reason for visit:  Flu  Symptom onset:  1-3 days ago  Symptoms include:  Sore throat, headache, chills, body aches, minor nausea,  Symptom intensity:  Severe  Symptom progression:  Improving  Had these symptoms before:  Yes  Has tried/received treatment for these symptoms:  No  What makes it worse:  Fast movements, lighting, loud noises  What makes it better:  Drinking, sleeping    She eats 2-3 servings of fruits and vegetables daily.She consumes 2 sweetened beverage(s) daily.She exercises with enough effort to increase her heart rate 20 to 29 minutes per day.  She exercises with enough effort to increase her heart rate 4 days " per week.   She is taking medications regularly.     Fevers x last night. Up to 102 Tmax.   Mild rhinorrhea. No cough. Nausea, vomiting, diarrhea. Fatigued.     Patient works at a restaurant. No exposures that she is aware of.     Review of Systems  Constitutional, HEENT, cardiovascular, pulmonary, gi and gu systems are negative, except as otherwise noted.      Objective    /75 (BP Location: Right arm, Patient Position: Sitting, Cuff Size: Adult Regular)   Pulse (!) 121   Temp 100  F (37.8  C) (Temporal)   Resp 20   Wt 72.1 kg (158 lb 14.4 oz)   SpO2 97%   BMI 31.03 kg/m    Body mass index is 31.03 kg/m .  Physical Exam   GENERAL: alert and no distress  EYES: Eyes grossly normal to inspection, PERRL and conjunctivae and sclerae normal  HENT: ear canals and TM's normal, nose and erythemic posterior pharynx  NECK: tonsillar LAD  RESP: lungs clear to auscultation - no rales, rhonchi or wheezes  CV: regular rate and rhythm, normal S1 S2, no S3 or S4  Results for orders placed or performed in visit on 05/14/24   Streptococcus A Rapid Screen w/Reflex to PCR - Clinic Collect     Status: Normal    Specimen: Throat; Swab   Result Value Ref Range    Group A Strep antigen Negative Negative           Signed Electronically by: Caden Blackmon PA-C

## 2024-05-14 NOTE — PROGRESS NOTES
{PROVIDER CHARTING PREFERENCE:779132}    Ricky Berg is a 29 year old, presenting for the following health issues:  Fevers x yesterday. Tmax 102 at 5 pm yesterday.   Body aches and fatigue.   ST--irritated and sharp. Improving.   No ear pain. Mild congestion. No cough. Nausea, vomiting, diarrhea.   Headaches severe.   tylenol      Review of Systems  Constitutional, HEENT, cardiovascular, pulmonary, gi and gu systems are negative, except as otherwise noted.      Objective    There were no vitals taken for this visit.  There is no height or weight on file to calculate BMI.  Physical Exam   {Exam List (Optional):658461}    No results found for any visits on 05/14/24.        Signed Electronically by: Caden Blackmon PA-C    Answers submitted by the patient for this visit:  Patient Health Questionnaire (Submitted on 5/14/2024)  If you checked off any problems, how difficult have these problems made it for you to do your work, take care of things at home, or get along with other people?: Not difficult at all  PHQ9 TOTAL SCORE: 5  Migraine Visit Questionnaire (Submitted on 5/14/2024)  Chief Complaint: Chronic problems general questions HPI Form  Headache Symptoms are: no change  How often are you getting headaches or migraines? : 1  Are you able to do normal daily activities when you have a migraine?: No  Migraine Rescue/Relief Medications:: Tylenol  Effectiveness of rescue/relief medications:: I get only a small amount of relief  Migraine Preventative Medications:: no medications to prevent migraines  ER or UC Visits:: 0 times  General Questionnaire (Submitted on 5/14/2024)  Chief Complaint: Chronic problems general questions HPI Form  How many servings of fruits and vegetables do you eat daily?: 2-3  On average, how many sweetened beverages do you drink each day (Examples: soda, juice, sweet tea, etc.  Do NOT count diet or artificially sweetened beverages)?: 2  How many minutes a day do you exercise  enough to make your heart beat faster?: 20 to 29  How many days a week do you exercise enough to make your heart beat faster?: 4  How many days per week do you miss taking your medication?: 0  General Concern (Submitted on 5/14/2024)  Chief Complaint: Chronic problems general questions HPI Form  What is the reason for your visit today?: flu  When did your symptoms begin?: 1-3 days ago  What are your symptoms?: sore throat, headache, chills, body aches, minor nausea,  How would you describe these symptoms?: Severe  Are your symptoms:: Improving  Have you had these symptoms before?: Yes  Have you tried or received treatment for these symptoms before?: No  Is there anything that makes you feel worse?: fast movements, lighting, loud noises  Is there anything that makes you feel better?: drinking, sleeping

## 2024-05-20 ENCOUNTER — E-VISIT (OUTPATIENT)
Dept: URGENT CARE | Facility: CLINIC | Age: 29
End: 2024-05-20
Payer: COMMERCIAL

## 2024-05-20 DIAGNOSIS — H10.33 ACUTE BACTERIAL CONJUNCTIVITIS OF BOTH EYES: Primary | ICD-10-CM

## 2024-05-20 PROCEDURE — 99207 PR NON-BILLABLE SERV PER CHARTING: CPT | Performed by: NURSE PRACTITIONER

## 2024-05-20 RX ORDER — POLYMYXIN B SULFATE AND TRIMETHOPRIM 1; 10000 MG/ML; [USP'U]/ML
SOLUTION OPHTHALMIC
Qty: 10 ML | Refills: 0 | Status: SHIPPED | OUTPATIENT
Start: 2024-05-20 | End: 2024-05-27

## 2024-05-20 NOTE — PATIENT INSTRUCTIONS
Thank you for choosing us for your care. I have placed an order for a prescription so that you can start treatment. View your full visit summary for details by clicking on the link below. Your pharmacist will able to address any questions you may have about the medication.     If you re not feeling better within 2-3 days, please schedule an appointment.  You can schedule an appointment right here in [facility], or call [phone]  If the visit is for the same symptoms as your eVisit, we ll refund the cost of your eVisit if seen within seven days.    Pinkeye: Care Instructions  Overview     Pinkeye is redness and swelling of the eye surface and the conjunctiva (the lining of the eyelid and the covering of the white part of the eye). Pinkeye is also called conjunctivitis. Pinkeye is often caused by infection with bacteria or a virus. Dry air, allergies, smoke, and chemicals are other common causes.  Pinkeye often gets better on its own in 7 to 10 days. Antibiotics only help if the pinkeye is caused by bacteria. Pinkeye caused by infection spreads easily. If an allergy or chemical is causing pinkeye, it will not go away unless you can avoid whatever is causing it.  Follow-up care is a key part of your treatment and safety. Be sure to make and go to all appointments, and call your doctor if you are having problems. It's also a good idea to know your test results and keep a list of the medicines you take.  How can you care for yourself at home?  Wash your hands often. Always wash them before and after you treat pinkeye or touch your eyes or face.  Use moist cotton or a clean, wet cloth to remove crust. Wipe from the inside corner of the eye to the outside. Use a clean part of the cloth for each wipe.  Put cold or warm wet cloths on your eye a few times a day if the eye hurts.  Do not wear contact lenses or eye makeup until the pinkeye is gone. Throw away any eye makeup you were using when you got pinkeye. Clean your  "contacts and storage case. If you wear disposable contacts, use a new pair when your eye has cleared and it is safe to wear contacts again.  If the doctor gave you antibiotic ointment or eyedrops, use them as directed. Use the medicine for as long as instructed, even if your eye starts looking better soon. Keep the bottle tip clean, and do not let it touch the eye area.  To put in eyedrops or ointment:  Tilt your head back, and pull your lower eyelid down with one finger.  Drop or squirt the medicine inside the lower lid.  Close your eye for 30 to 60 seconds to let the drops or ointment move around.  Do not touch the ointment or dropper tip to your eyelashes or any other surface.  Do not share towels, pillows, or washcloths while you have pinkeye.  When should you call for help?   Call your doctor now or seek immediate medical care if:    You have pain in your eye, not just irritation on the surface.     You have a change in vision or loss of vision.     You have an increase in discharge from the eye.     Your eye has not started to improve or begins to get worse within 48 hours after you start using antibiotics.     Pinkeye lasts longer than 7 days.   Watch closely for changes in your health, and be sure to contact your doctor if you have any problems.  Where can you learn more?  Go to https://www.[employer].net/patiented  Enter Y392 in the search box to learn more about \"Pinkeye: Care Instructions.\"  Current as of: June 5, 2023               Content Version: 14.0    1358-7058 [employer].   Care instructions adapted under license by your healthcare professional. If you have questions about a medical condition or this instruction, always ask your healthcare professional. [employer] disclaims any warranty or liability for your use of this information.      "

## 2024-05-22 ENCOUNTER — OFFICE VISIT (OUTPATIENT)
Dept: URGENT CARE | Facility: URGENT CARE | Age: 29
End: 2024-05-22
Payer: COMMERCIAL

## 2024-05-22 VITALS
OXYGEN SATURATION: 98 % | HEART RATE: 107 BPM | SYSTOLIC BLOOD PRESSURE: 120 MMHG | DIASTOLIC BLOOD PRESSURE: 82 MMHG | TEMPERATURE: 98.4 F

## 2024-05-22 DIAGNOSIS — H10.9 CONJUNCTIVITIS OF BOTH EYES, UNSPECIFIED CONJUNCTIVITIS TYPE: Primary | ICD-10-CM

## 2024-05-22 PROCEDURE — 99213 OFFICE O/P EST LOW 20 MIN: CPT | Performed by: FAMILY MEDICINE

## 2024-05-22 RX ORDER — OFLOXACIN 3 MG/ML
1-2 SOLUTION/ DROPS OPHTHALMIC 4 TIMES DAILY
Qty: 10 ML | Refills: 0 | Status: SHIPPED | OUTPATIENT
Start: 2024-05-22 | End: 2024-05-29

## 2024-05-22 NOTE — PROGRESS NOTES
SUBJECTIVE:  Chief Complaint:   Chief Complaint   Patient presents with    Urgent Care     Severe pink eye started Sunday night, pt used eye drops and it's not getting better. Very painful eyes.      History of Present Illness:  Morena Solorzano is a 29 year old female who presents complaining of moderate both eyes discharge, redness for 4 day(s).   Onset/timing: sudden, worsening.    Associated Signs and Symptoms: runny nose, scratchy throat  Treatment measures tried include: Polytrim  Contact wearer : No    Has underlying seasonal allergies.  Right eye is worse, tearing more and feels more painful    Given polytrim thru E-visit, symptoms persists/worsening.  Other family member with similar pinkeye, improved with Ofloxacin      No past medical history on file.  Current Outpatient Medications   Medication Sig Dispense Refill    levothyroxine (EUTHYROX) 88 MCG tablet Take 1 tablet (88 mcg) by mouth daily 90 tablet 0    polymixin b-trimethoprim (POLYTRIM) 58859-3.1 UNIT/ML-% ophthalmic solution 1 drop in affected eye(s) every 3 hrs while awake for 5-7 days until resolved - max 6 doses per day 10 mL 0    cyclobenzaprine (FLEXERIL) 10 MG tablet Take 1 tablet (10 mg) by mouth 3 times daily as needed for muscle spasms (Patient not taking: Reported on 5/14/2024) 15 tablet 0    FLOWFLEX COVID-19 AG HOME TEST KIT  (Patient not taking: Reported on 5/14/2024)      methylPREDNISolone (MEDROL DOSEPAK) 4 MG tablet therapy pack Follow Package Directions 21 tablet 0        ROS:  Review of systems negative except as stated above.    OBJECTIVE:  /82 (BP Location: Right arm, Patient Position: Sitting, Cuff Size: Adult Regular)   Pulse 107   Temp 98.4  F (36.9  C) (Tympanic)   SpO2 98%   General: no acute distress  Eye exam: right eye abnormal findings: conjunctivitis with erythema, left eye abnormal findings: conjunctivitis with erythema.      ASSESSMENT/PLAN:  (H10.9) Conjunctivitis of both eyes, unspecified conjunctivitis  type  (primary encounter diagnosis)  Plan: ofloxacin (OCUFLOX) 0.3 % ophthalmic solution            Empiric treatment for presumptive bacterial conjunctivitis with RX Ofloxacin as patient was not responding to Polytrim.  Reviewed concurrent possible allergies and dry eyes, encourage to use lubricating eye drops.    Follow up with eye clinic if no improvement of symptoms for full eye check    Uriah Dee MD  May 22, 2024 10:54 AM

## 2024-07-31 ENCOUNTER — OFFICE VISIT (OUTPATIENT)
Dept: PEDIATRICS | Facility: CLINIC | Age: 29
End: 2024-07-31
Payer: COMMERCIAL

## 2024-07-31 ENCOUNTER — TELEPHONE (OUTPATIENT)
Dept: PEDIATRICS | Facility: CLINIC | Age: 29
End: 2024-07-31

## 2024-07-31 VITALS
TEMPERATURE: 98.3 F | SYSTOLIC BLOOD PRESSURE: 110 MMHG | WEIGHT: 162.2 LBS | HEART RATE: 59 BPM | RESPIRATION RATE: 16 BRPM | HEIGHT: 61 IN | DIASTOLIC BLOOD PRESSURE: 70 MMHG | BODY MASS INDEX: 30.62 KG/M2 | OXYGEN SATURATION: 98 %

## 2024-07-31 DIAGNOSIS — S61.451A DOG BITE OF RIGHT HAND, INITIAL ENCOUNTER: Primary | ICD-10-CM
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
## 2024-07-31 PROCEDURE — G2211 COMPLEX E/M VISIT ADD ON: HCPCS | Performed by: PHYSICIAN ASSISTANT

## 2024-07-31 PROCEDURE — 99213 OFFICE O/P EST LOW 20 MIN: CPT | Performed by: PHYSICIAN ASSISTANT

## 2024-07-31 ASSESSMENT — PAIN SCALES - GENERAL: PAINLEVEL: MODERATE PAIN (4)

## 2024-07-31 NOTE — PROGRESS NOTES
Assessment & Plan     Dog bite of right hand, initial encounter  Wound was cleaned here in clinic and wrapped up.   Recommend starting antibiotics.   Monitor and change dressing daily.   Keep wrapped up for the next week as able.   Discussed signs and symptoms of infection.   Tetanus up to date.  Recommend send picture updates as needed or return for another visit if concerned.   Follow-up if fevers, significant redness, pain, pus formation, streaking up/down the arm.  - amoxicillin-clavulanate (AUGMENTIN) 875-125 MG tablet  Dispense: 14 tablet; Refill: 0    The longitudinal plan of care for the diagnosis(es)/condition(s) as documented were addressed during this visit. Due to the added complexity in care, I will continue to support Morena in the subsequent management and with ongoing continuity of care with PCP.    FUTURE APPOINTMENTS:       - Follow-up as needed if symptoms are worsening or not improving    Subjective   Morena is a 29 year old, presenting for the following health issues:  Dog Bite    History of Present Illness       Reason for visit:  Dog bite  Symptom onset:  Today  Symptoms include:  Sore and swallen  Symptom intensity:  Moderate  Symptom progression:  Improving  Had these symptoms before:  No  What makes it worse:  Movement of the hand  What makes it better:  Keeping flat    She eats 4 or more servings of fruits and vegetables daily.She consumes 2 sweetened beverage(s) daily.She exercises with enough effort to increase her heart rate 30 to 60 minutes per day.  She exercises with enough effort to increase her heart rate 4 days per week.   She is taking medications regularly.     Patient is a 29 y.o. female who presents to the clinic for dog bite. This morning around 6 am patient was trying to separate her two dachshunds and accidentally was bit on the right hand. Patient has two puncture wounds on the dorsal side of the hand. Patient reports some soreness and took some ibuprofen. Pt reports her  "dogs are healthy and up to date on vaccinations. She did clean the wound after the injury. It has continued to bleed a little bit but it is controlled. Pt reports she can move her hand and fingers but there is more soreness when moving the thumb. She is up to date with tetanus vaccine (2022).            Review of Systems  Constitutional, HEENT, cardiovascular, pulmonary, gi and gu systems are negative, except as otherwise noted.      Objective    /70 (BP Location: Left arm, Patient Position: Sitting, Cuff Size: Adult Regular)   Pulse 59   Temp 98.3  F (36.8  C) (Oral)   Resp 16   Ht 1.54 m (5' 0.63\")   Wt 73.6 kg (162 lb 3.2 oz)   LMP 07/08/2024 (Approximate)   SpO2 98%   BMI 31.02 kg/m    Body mass index is 31.02 kg/m .    Physical Exam   GENERAL: alert and no distress  MS: right hand with two puncture wounds on dorsal aspect near the thumb. (See photo for details). Slight erythema around bite. Mild tenderness. Mild swelling. No induration.  SKIN: no rashes  PSYCH: mentation appears normal, affect normal/bright        Signed Electronically by: Roxanne Morillo PA-C    "

## 2024-07-31 NOTE — TELEPHONE ENCOUNTER
Pt calls.    This morning she got a dog bite from one of her dogs.  She said her hand was bit pretty good.  It is a puncture wound.  She has washed it with soap and water.  There is an open area.  She does not think it needs stitches.  It is about the size of 1/2 a grain of rice.  The area is smaller than a pee.      She is wondering if she should come in.  Advised she should be seen.  Advised that sometimes patients are put on antibiotics as animals mouths are dirty.  Advised if the provider felt like she needed more - if needed stitches, etc then she would send her on.      Appt scheduled for this morning.    Appointments in Next Year      Jul 31, 2024 9:00 AM  (Arrive by 8:40 AM)  Provider Visit with Roxanne Morillo PA-C  [facility] ([facility] ) [phone]

## 2024-08-08 ENCOUNTER — MYC REFILL (OUTPATIENT)
Dept: PEDIATRICS | Facility: CLINIC | Age: 29
End: 2024-08-08
Payer: COMMERCIAL

## 2024-08-08 DIAGNOSIS — E03.9 ACQUIRED HYPOTHYROIDISM: ICD-10-CM

## 2024-08-08 RX ORDER — LEVOTHYROXINE SODIUM 88 UG/1
88 TABLET ORAL DAILY
Qty: 90 TABLET | Refills: 0 | Status: SHIPPED | OUTPATIENT
Start: 2024-08-08

## 2024-09-28 ENCOUNTER — HEALTH MAINTENANCE LETTER (OUTPATIENT)
Age: 29
End: 2024-09-28

## 2024-10-14 SDOH — HEALTH STABILITY: PHYSICAL HEALTH: ON AVERAGE, HOW MANY DAYS PER WEEK DO YOU ENGAGE IN MODERATE TO STRENUOUS EXERCISE (LIKE A BRISK WALK)?: 3 DAYS

## 2024-10-14 SDOH — HEALTH STABILITY: PHYSICAL HEALTH: ON AVERAGE, HOW MANY MINUTES DO YOU ENGAGE IN EXERCISE AT THIS LEVEL?: 40 MIN

## 2024-10-14 ASSESSMENT — SOCIAL DETERMINANTS OF HEALTH (SDOH): HOW OFTEN DO YOU GET TOGETHER WITH FRIENDS OR RELATIVES?: TWICE A WEEK

## 2024-10-15 ENCOUNTER — OFFICE VISIT (OUTPATIENT)
Dept: PEDIATRICS | Facility: CLINIC | Age: 29
End: 2024-10-15
Attending: INTERNAL MEDICINE
Payer: COMMERCIAL

## 2024-10-15 VITALS
SYSTOLIC BLOOD PRESSURE: 124 MMHG | BODY MASS INDEX: 30.43 KG/M2 | DIASTOLIC BLOOD PRESSURE: 84 MMHG | HEIGHT: 61 IN | RESPIRATION RATE: 16 BRPM | WEIGHT: 161.2 LBS | TEMPERATURE: 97.8 F | OXYGEN SATURATION: 100 % | HEART RATE: 78 BPM

## 2024-10-15 DIAGNOSIS — Z00.00 ROUTINE GENERAL MEDICAL EXAMINATION AT A HEALTH CARE FACILITY: Primary | ICD-10-CM

## 2024-10-15 DIAGNOSIS — Z13.0 SCREENING, ANEMIA, DEFICIENCY, IRON: ICD-10-CM

## 2024-10-15 DIAGNOSIS — E66.811 CLASS 1 OBESITY WITH BODY MASS INDEX (BMI) OF 30.0 TO 30.9 IN ADULT, UNSPECIFIED OBESITY TYPE, UNSPECIFIED WHETHER SERIOUS COMORBIDITY PRESENT: ICD-10-CM

## 2024-10-15 DIAGNOSIS — Z12.4 CERVICAL CANCER SCREENING: ICD-10-CM

## 2024-10-15 DIAGNOSIS — Z13.220 ENCOUNTER FOR LIPID SCREENING FOR CARDIOVASCULAR DISEASE: ICD-10-CM

## 2024-10-15 DIAGNOSIS — E03.9 ACQUIRED HYPOTHYROIDISM: ICD-10-CM

## 2024-10-15 DIAGNOSIS — Z13.6 ENCOUNTER FOR LIPID SCREENING FOR CARDIOVASCULAR DISEASE: ICD-10-CM

## 2024-10-15 DIAGNOSIS — B36.0 TINEA VERSICOLOR: ICD-10-CM

## 2024-10-15 DIAGNOSIS — Z12.83 SKIN CANCER SCREENING: ICD-10-CM

## 2024-10-15 LAB
ALBUMIN SERPL BCG-MCNC: 4.3 G/DL (ref 3.5–5.2)
ALP SERPL-CCNC: 79 U/L (ref 40–150)
ALT SERPL W P-5'-P-CCNC: 17 U/L (ref 0–50)
ANION GAP SERPL CALCULATED.3IONS-SCNC: 9 MMOL/L (ref 7–15)
AST SERPL W P-5'-P-CCNC: 23 U/L (ref 0–45)
BILIRUB SERPL-MCNC: 0.5 MG/DL
BUN SERPL-MCNC: 8.6 MG/DL (ref 6–20)
CALCIUM SERPL-MCNC: 9.5 MG/DL (ref 8.8–10.4)
CHLORIDE SERPL-SCNC: 104 MMOL/L (ref 98–107)
CHOLEST SERPL-MCNC: 203 MG/DL
CREAT SERPL-MCNC: 0.72 MG/DL (ref 0.51–0.95)
EGFRCR SERPLBLD CKD-EPI 2021: >90 ML/MIN/1.73M2
FASTING STATUS PATIENT QL REPORTED: YES
FASTING STATUS PATIENT QL REPORTED: YES
FERRITIN SERPL-MCNC: 9 NG/ML (ref 6–175)
GLUCOSE SERPL-MCNC: 90 MG/DL (ref 70–99)
HCO3 SERPL-SCNC: 25 MMOL/L (ref 22–29)
HDLC SERPL-MCNC: 70 MG/DL
HGB BLD-MCNC: 12.3 G/DL (ref 11.7–15.7)
HOLD SPECIMEN: NORMAL
LDLC SERPL CALC-MCNC: 116 MG/DL
NONHDLC SERPL-MCNC: 133 MG/DL
POTASSIUM SERPL-SCNC: 4.1 MMOL/L (ref 3.4–5.3)
PROT SERPL-MCNC: 7.7 G/DL (ref 6.4–8.3)
SODIUM SERPL-SCNC: 138 MMOL/L (ref 135–145)
T4 FREE SERPL-MCNC: 1.33 NG/DL (ref 0.9–1.7)
TRIGL SERPL-MCNC: 84 MG/DL
TSH SERPL DL<=0.005 MIU/L-ACNC: 5.4 UIU/ML (ref 0.3–4.2)

## 2024-10-15 PROCEDURE — 99395 PREV VISIT EST AGE 18-39: CPT | Mod: 57 | Performed by: INTERNAL MEDICINE

## 2024-10-15 PROCEDURE — 82728 ASSAY OF FERRITIN: CPT | Performed by: INTERNAL MEDICINE

## 2024-10-15 PROCEDURE — G0145 SCR C/V CYTO,THINLAYER,RESCR: HCPCS | Performed by: INTERNAL MEDICINE

## 2024-10-15 PROCEDURE — 87624 HPV HI-RISK TYP POOLED RSLT: CPT | Performed by: INTERNAL MEDICINE

## 2024-10-15 PROCEDURE — 84439 ASSAY OF FREE THYROXINE: CPT | Performed by: INTERNAL MEDICINE

## 2024-10-15 PROCEDURE — 84443 ASSAY THYROID STIM HORMONE: CPT | Performed by: INTERNAL MEDICINE

## 2024-10-15 PROCEDURE — 80061 LIPID PANEL: CPT | Performed by: INTERNAL MEDICINE

## 2024-10-15 PROCEDURE — 36415 COLL VENOUS BLD VENIPUNCTURE: CPT | Performed by: INTERNAL MEDICINE

## 2024-10-15 PROCEDURE — 90656 IIV3 VACC NO PRSV 0.5 ML IM: CPT | Performed by: INTERNAL MEDICINE

## 2024-10-15 PROCEDURE — 90471 IMMUNIZATION ADMIN: CPT | Performed by: INTERNAL MEDICINE

## 2024-10-15 PROCEDURE — 80053 COMPREHEN METABOLIC PANEL: CPT | Performed by: INTERNAL MEDICINE

## 2024-10-15 PROCEDURE — 99214 OFFICE O/P EST MOD 30 MIN: CPT | Mod: 25 | Performed by: INTERNAL MEDICINE

## 2024-10-15 PROCEDURE — 85018 HEMOGLOBIN: CPT | Performed by: INTERNAL MEDICINE

## 2024-10-15 RX ORDER — LEVOTHYROXINE SODIUM 88 UG/1
88 TABLET ORAL DAILY
Qty: 90 TABLET | Refills: 0 | Status: SHIPPED | OUTPATIENT
Start: 2024-10-15

## 2024-10-15 ASSESSMENT — PATIENT HEALTH QUESTIONNAIRE - PHQ9: SUM OF ALL RESPONSES TO PHQ QUESTIONS 1-9: 11

## 2024-10-15 ASSESSMENT — PAIN SCALES - GENERAL: PAINLEVEL: MILD PAIN (3)

## 2024-10-15 NOTE — PATIENT INSTRUCTIONS
Patient Education   Preventive Care Advice   This is general advice given by our system to help you stay healthy. However, your care team may have specific advice just for you. Please talk to your care team about your preventive care needs.  Nutrition  Eat 5 or more servings of fruits and vegetables each day.  Try wheat bread, brown rice and whole grain pasta (instead of white bread, rice, and pasta).  Get enough calcium and vitamin D. Check the label on foods and aim for 100% of the RDA (recommended daily allowance).  Lifestyle  Exercise at least 150 minutes each week  (30 minutes a day, 5 days a week).  Do muscle strengthening activities 2 days a week. These help control your weight and prevent disease.  No smoking.  Wear sunscreen to prevent skin cancer.  Have a dental exam and cleaning every 6 months.  Yearly exams  See your health care team every year to talk about:  Any changes in your health.  Any medicines your care team has prescribed.  Preventive care, family planning, and ways to prevent chronic diseases.  Shots (vaccines)   HPV shots (up to age 26), if you've never had them before.  Hepatitis B shots (up to age 59), if you've never had them before.  COVID-19 shot: Get this shot when it's due.  Flu shot: Get a flu shot every year.  Tetanus shot: Get a tetanus shot every 10 years.  Pneumococcal, hepatitis A, and RSV shots: Ask your care team if you need these based on your risk.  Shingles shot (for age 50 and up)  General health tests  Diabetes screening:  Starting at age 35, Get screened for diabetes at least every 3 years.  If you are younger than age 35, ask your care team if you should be screened for diabetes.  Cholesterol test: At age 39, start having a cholesterol test every 5 years, or more often if advised.  Bone density scan (DEXA): At age 50, ask your care team if you should have this scan for osteoporosis (brittle bones).  Hepatitis C: Get tested at least once in your life.  STIs (sexually  transmitted infections)  Before age 24: Ask your care team if you should be screened for STIs.  After age 24: Get screened for STIs if you're at risk. You are at risk for STIs (including HIV) if:  You are sexually active with more than one person.  You don't use condoms every time.  You or a partner was diagnosed with a sexually transmitted infection.  If you are at risk for HIV, ask about PrEP medicine to prevent HIV.  Get tested for HIV at least once in your life, whether you are at risk for HIV or not.  Cancer screening tests  Cervical cancer screening: If you have a cervix, begin getting regular cervical cancer screening tests starting at age 21.  Breast cancer scan (mammogram): If you've ever had breasts, begin having regular mammograms starting at age 40. This is a scan to check for breast cancer.  Colon cancer screening: It is important to start screening for colon cancer at age 45.  Have a colonoscopy test every 10 years (or more often if you're at risk) Or, ask your provider about stool tests like a FIT test every year or Cologuard test every 3 years.  To learn more about your testing options, visit:   .  For help making a decision, visit:   https://bit.ly/vj82478.  Prostate cancer screening test: If you have a prostate, ask your care team if a prostate cancer screening test (PSA) at age 55 is right for you.  Lung cancer screening: If you are a current or former smoker ages 50 to 80, ask your care team if ongoing lung cancer screenings are right for you.  For informational purposes only. Not to replace the advice of your health care provider. Copyright   2023 [facility]. All rights reserved. Clinically reviewed by the [facility]. AudioName [id number] - REV 01/24.  Learning About Depression Screening  What is depression screening?  Depression screening is a way to see if you have depression symptoms. It may be done by a doctor or counselor. It's often part of a routine  "checkup. That's because your mental health is just as important as your physical health.  Depression is a mental health condition that affects how you feel, think, and act. You may:  Have less energy.  Lose interest in your daily activities.  Feel sad and grouchy for a long time.  Depression is very common. It affects people of all ages.  Many things can lead to depression. Some people become depressed after they have a stroke or find out they have a major illness like cancer or heart disease. The death of a loved one or a breakup may lead to depression. It can run in families. Most experts believe that a combination of inherited genes and stressful life events can cause it.  What happens during screening?  You may be asked to fill out a form about your depression symptoms. You and the doctor will discuss your answers. The doctor may ask you more questions to learn more about how you think, act, and feel.  What happens after screening?  If you have symptoms of depression, your doctor will talk to you about your options.  Doctors usually treat depression with medicines or counseling. Often, combining the two works best. Many people don't get help because they think that they'll get over the depression on their own. But people with depression may not get better unless they get treatment.  The cause of depression is not well understood. There may be many factors involved. But if you have depression, it's not your fault.  A serious symptom of depression is thinking about death or suicide. If you or someone you care about talks about this or about feeling hopeless, get help right away.  It's important to know that depression can be treated. Medicine, counseling, and self-care may help.  Where can you learn more?  Go to https://www.[employer].net/patiented  Enter T185 in the search box to learn more about \"Learning About Depression Screening.\"  Current as of: June 24, 2023  Content Version: 14.2 2024 [employer].   Care instructions adapted under license by your healthcare professional. If you have questions about a medical condition or this instruction, always ask your healthcare professional. [employer] disclaims any warranty or liability for your use of this information.

## 2024-10-15 NOTE — PROGRESS NOTES
"Preventive Care Visit  [facility] TJ Alan MD, Internal Medicine - Pediatrics  Oct 15, 2024      Assessment & Plan     Routine general medical examination at a health care facility      Acquired hypothyroidism  - Stable, no side effects with medications, refilled meds today  - due for annual labs  - TSH WITH FREE T4 REFLEX; Future  - levothyroxine (EUTHYROX) 88 MCG tablet; Take 1 tablet (88 mcg) by mouth daily.    BMI > 30  Discussed healthy lifestyle.  - Lipid panel reflex to direct LDL Fasting; Future  - Comprehensive metabolic panel (BMP + Alb, Alk Phos, ALT, AST, Total. Bili, TP); Future    Encounter for lipid screening for cardiovascular disease  - Lipid panel reflex to direct LDL Fasting; Future    Screening, anemia, deficiency, iron  - Hemoglobin; Future  - Ferritin; Future    Tinea versicolor  Long standing and has been very difficult to treat.  - Adult Dermatology  Referral; Future    Skin cancer screening  - Adult Dermatology  Referral; Future    Cervical cancer screening  - HPV and Gynecologic Cytology Panel - Recommended Age 30 - 65 Years          BMI  Estimated body mass index is 30.96 kg/m  as calculated from the following:    Height as of this encounter: 1.537 m (5' 0.5\").    Weight as of this encounter: 73.1 kg (161 lb 3.2 oz).       Counseling  Appropriate preventive services were addressed with this patient via screening, questionnaire, or discussion as appropriate for fall prevention, nutrition, physical activity, Tobacco-use cessation, social engagement, weight loss and cognition.  Checklist reviewing preventive services available has been given to the patient.  Reviewed patient's diet, addressing concerns and/or questions.   She is at risk for lack of exercise and has been provided with information to increase physical activity for the benefit of her well-being.   The patient's PHQ-9 score is consistent with moderate depression. She was provided " with information regarding depression.       See Patient Instructions    Subjective   Morena is a 29 year old, presenting for the following:  Wellness Visit        10/15/2024     9:26 AM   Additional Questions   Roomed by KM   Accompanied by Self         10/15/2024     9:26 AM   Patient Reported Additional Medications   Patient reports taking the following new medications None        Health Care Directive  Patient does not have a Health Care Directive or Living Will:     HPI          10/14/2024   General Health   How would you rate your overall physical health? Good   Feel stress (tense, anxious, or unable to sleep) To some extent      (!) STRESS CONCERN      10/14/2024   Nutrition   Three or more servings of calcium each day? Yes   Diet: Regular (no restrictions)   How many servings of fruit and vegetables per day? (!) 2-3   How many sweetened beverages each day? (!) 2            10/14/2024   Exercise   Days per week of moderate/strenous exercise 3 days   Average minutes spent exercising at this level 40 min            10/14/2024   Social Factors   Frequency of gathering with friends or relatives Twice a week   Worry food won't last until get money to buy more No   Food not last or not have enough money for food? No   Do you have housing? (Housing is defined as stable permanent housing and does not include staying ouside in a car, in a tent, in an abandoned building, in an overnight shelter, or couch-surfing.) Yes   Are you worried about losing your housing? No   Lack of transportation? No   Unable to get utilities (heat,electricity)? No            10/14/2024   Dental   Dentist two times every year? Yes            10/14/2024   TB Screening   Were you born outside of the [place]? Yes          Today's PHQ-9 Score:       10/15/2024     9:30 AM   PHQ-9 SCORE   PHQ-9 Total Score 11         10/14/2024   Substance Use   Alcohol more than 3/day or more than 7/wk No   Do you use any other substances recreationally? No     "    Social History     Tobacco Use    Smoking status: Never     Passive exposure: Never    Smokeless tobacco: Never   Vaping Use    Vaping status: Never Used           6/25/2022   LAST FHS-7 RESULTS   1st degree relative breast or ovarian cancer Yes   Any relative bilateral breast cancer Yes   Any male have breast cancer No   Any ONE woman have BOTH breast AND ovarian cancer No   Any woman with breast cancer before 50yrs Yes   2 or more relatives with breast AND/OR ovarian cancer Yes   2 or more relatives with breast AND/OR bowel cancer Yes           Mammogram Screening - Patient under 40 years of age: Routine Mammogram Screening not recommended.         10/14/2024   STI Screening   New sexual partner(s) since last STI/HIV test? No        History of abnormal Pap smear: No - age 30- 64 PAP with HPV every 5 years recommended        Latest Ref Rng & Units 5/11/2021    11:09 AM 7/26/2017    12:46 PM   PAP / HPV   PAP Negative for squamous intraepithelial lesion or malignancy. Negative for squamous intraepithelial lesion or malignancy  Electronically signed by Lenka Mejia CT (ASCP) on 5/12/2021 at  2:48 PM    Negative for squamous intraepithelial lesion or malignancy  Electronically signed by Nitza Noriega CT (ASCP) on 8/3/2017 at 11:54 AM              10/14/2024   Contraception/Family Planning   Questions about contraception or family planning No           Reviewed and updated as needed this visit by Provider                        All other systems on a 10-point review are negative, unless otherwise noted in HPI       Objective    Exam  /84 (BP Location: Right arm, Patient Position: Sitting, Cuff Size: Adult Regular)   Pulse 78   Temp 97.8  F (36.6  C) (Tympanic)   Resp 16   Ht 1.537 m (5' 0.5\")   Wt 73.1 kg (161 lb 3.2 oz)   LMP 10/08/2024 (Exact Date)   SpO2 100%   BMI 30.96 kg/m     Estimated body mass index is 30.96 kg/m  as calculated from the following:    Height as of this encounter: " "1.537 m (5' 0.5\").    Weight as of this encounter: 73.1 kg (161 lb 3.2 oz).    Physical Exam  GENERAL: alert and no distress  EYES: Eyes grossly normal to inspection, PERRL and conjunctivae and sclerae normal  HENT: ear canals and TM's normal, nose and mouth without ulcers or lesions  NECK: no adenopathy, no asymmetry, masses, or scars  RESP: lungs clear to auscultation - no rales, rhonchi or wheezes  CV: regular rate and rhythm, normal S1 S2, no S3 or S4, no murmur, click or rub, no peripheral edema  ABDOMEN: soft, nontender, no hepatosplenomegaly, no masses and bowel sounds normal   (female): normal female external genitalia, normal urethral meatus, normal vaginal mucosa  MS: no gross musculoskeletal defects noted, no edema  SKIN: no suspicious lesions or rashes  NEURO: Normal strength and tone, mentation intact and speech normal  PSYCH: mentation appears normal, affect normal/bright        Signed Electronically by: Liborio Alan MD    "

## 2024-10-16 LAB
HPV HR 12 DNA CVX QL NAA+PROBE: NEGATIVE
HPV16 DNA CVX QL NAA+PROBE: NEGATIVE
HPV18 DNA CVX QL NAA+PROBE: NEGATIVE
HUMAN PAPILLOMA VIRUS FINAL DIAGNOSIS: NORMAL

## 2024-10-17 NOTE — RESULT ENCOUNTER NOTE
Dear Morena,    Your TSH (thyroid stimulating hormone) is elevated, however your thyroid hormone level (T4) is normal.  I recommend you continue your current dose of medication and we recheck levels in 2-3 months.  Please make sure to take your thyroid medication alone at least 30 to 60 minutes before food. Alternatively, may consistently administer at night 3 to 4 hours after the last meal. Do not administer within 4 hours of calcium- or iron-containing products.    Please schedule a lab-only appointment in 2-3 months for a thyroid lab recheck.    Your cholesterol results came back slightly elevated compared to last time checked, however not high enough to start a cholesterol medication.  I recommend to focus on lifestyle modifications including healthy diet (look at the mediterranean diet) and regular physical activity.    The remainder of your lab results are stable.  At this time, I do not recommend any changes to your current plan of care.    Please feel free to call with any questions.  Otherwise, we can discuss further at your next appointment.    Sincerely,    Liborio Alan MD

## 2024-10-18 LAB
BKR AP ASSOCIATED HPV REPORT: NORMAL
BKR LAB AP GYN ADEQUACY: NORMAL
BKR LAB AP GYN INTERPRETATION: NORMAL
BKR LAB AP LMP: NORMAL
BKR LAB AP PREVIOUS ABNORMAL: NORMAL
PATH REPORT.COMMENTS IMP SPEC: NORMAL
PATH REPORT.COMMENTS IMP SPEC: NORMAL
PATH REPORT.RELEVANT HX SPEC: NORMAL

## 2024-11-09 ENCOUNTER — MYC REFILL (OUTPATIENT)
Dept: PEDIATRICS | Facility: CLINIC | Age: 29
End: 2024-11-09
Payer: COMMERCIAL

## 2024-11-09 DIAGNOSIS — E03.9 ACQUIRED HYPOTHYROIDISM: ICD-10-CM

## 2024-11-11 RX ORDER — LEVOTHYROXINE SODIUM 88 UG/1
88 TABLET ORAL DAILY
Qty: 90 TABLET | Refills: 0 | OUTPATIENT
Start: 2024-11-11

## 2025-02-19 ENCOUNTER — MYC REFILL (OUTPATIENT)
Dept: PEDIATRICS | Facility: CLINIC | Age: 30
End: 2025-02-19
Payer: COMMERCIAL

## 2025-02-19 DIAGNOSIS — E03.9 ACQUIRED HYPOTHYROIDISM: ICD-10-CM

## 2025-02-19 RX ORDER — LEVOTHYROXINE SODIUM 88 UG/1
88 TABLET ORAL DAILY
Qty: 90 TABLET | Refills: 0 | Status: SHIPPED | OUTPATIENT
Start: 2025-02-19

## 2025-02-19 NOTE — TELEPHONE ENCOUNTER
I'm providing a subhash refill because it looks like there may have been a gap in therapy - her last TSH was off and the plan was to recheck in 2-3 months (this was due around Dec/Yunior) - I recommend she restart meds and check labs in 2 months (prior to the next refill that is due).    Liborio Alan MD

## 2025-02-20 NOTE — TELEPHONE ENCOUNTER
RN called and spoke with patient. Patient states she had stopped taking it at some point, took some breaks for a week.     RN instructed patient to restart, helped schedule lab appointment 4/22/25 at 9:45am. Patient was given an opportunity to ask questions, verbalized understanding of plan, and is agreeable.        Kary Granger RN

## 2025-04-22 ENCOUNTER — LAB (OUTPATIENT)
Dept: LAB | Facility: CLINIC | Age: 30
End: 2025-04-22
Payer: COMMERCIAL

## 2025-04-22 DIAGNOSIS — E03.9 ACQUIRED HYPOTHYROIDISM: ICD-10-CM

## 2025-04-22 LAB — TSH SERPL DL<=0.005 MIU/L-ACNC: 2.19 UIU/ML (ref 0.3–4.2)

## 2025-04-22 PROCEDURE — 84443 ASSAY THYROID STIM HORMONE: CPT

## 2025-04-22 PROCEDURE — 36415 COLL VENOUS BLD VENIPUNCTURE: CPT

## 2025-04-24 NOTE — RESULT ENCOUNTER NOTE
Dear Morena,    Your lab results are within normal limits.  At this time, I do not recommend any changes to your current plan of care.    Please feel free to contact me with any questions.  Otherwise, we can discuss further at your next appointment.    Sincerely,    Liborio Alan MD

## 2025-05-19 ENCOUNTER — MYC REFILL (OUTPATIENT)
Dept: PEDIATRICS | Facility: CLINIC | Age: 30
End: 2025-05-19
Payer: COMMERCIAL

## 2025-05-19 DIAGNOSIS — E03.9 ACQUIRED HYPOTHYROIDISM: ICD-10-CM

## 2025-05-19 RX ORDER — LEVOTHYROXINE SODIUM 88 UG/1
88 TABLET ORAL DAILY
Qty: 90 TABLET | Refills: 0 | Status: SHIPPED | OUTPATIENT
Start: 2025-05-19

## 2025-06-05 ENCOUNTER — OFFICE VISIT (OUTPATIENT)
Dept: DERMATOLOGY | Facility: CLINIC | Age: 30
End: 2025-06-05
Payer: COMMERCIAL

## 2025-06-05 DIAGNOSIS — Z12.83 SKIN CANCER SCREENING: ICD-10-CM

## 2025-06-05 DIAGNOSIS — L73.9 FOLLICULITIS: Primary | ICD-10-CM

## 2025-06-05 DIAGNOSIS — B36.0 TINEA VERSICOLOR: ICD-10-CM

## 2025-06-05 RX ORDER — CLINDAMYCIN PHOSPHATE 10 UG/ML
LOTION TOPICAL 2 TIMES DAILY
Qty: 60 ML | Refills: 5 | Status: SHIPPED | OUTPATIENT
Start: 2025-06-05

## 2025-06-05 RX ORDER — KETOCONAZOLE 20 MG/ML
SHAMPOO, SUSPENSION TOPICAL
Qty: 120 ML | Refills: 11 | Status: SHIPPED | OUTPATIENT
Start: 2025-06-05

## 2025-06-05 RX ORDER — FLUCONAZOLE 100 MG/1
TABLET ORAL
Qty: 6 TABLET | Refills: 0 | Status: SHIPPED | OUTPATIENT
Start: 2025-06-05

## 2025-06-05 ASSESSMENT — PAIN SCALES - GENERAL: PAINLEVEL_OUTOF10: NO PAIN (0)

## 2025-06-05 NOTE — PATIENT INSTRUCTIONS
For the tinea versicolor, start ketoconazole shampoo 3 times weekly to daily in the shower.   Take 3 tablets (300 mg) by mouth once then take 3 more tablets (300 mg) one week later.     For the break outs, start BPO wash and clindamycin lotion.

## 2025-06-05 NOTE — NURSING NOTE
Dermatology Rooming Note    Morena Solorzano's goals for this visit include:   Chief Complaint   Patient presents with    Derm Problem     FBSE- Tinea versicolor, all over. Has a few moles of concern     Naty Harmon CA

## 2025-06-05 NOTE — LETTER
6/5/2025       RE: Morena Solorzano  [address]  [facility] [address]     Dear Colleague,    Thank you for referring your patient, Morena Solorzano, to the [facility] [place] [facility]. Please see a copy of my visit note below.    [facility] Dermatology Note  Encounter Date: Jun 5, 2025  Office Visit     Dermatology Problem List:  FBSE 06/05/25  #Tinea Versicolor   - Current: ketoconazole, fluconazole 300 mg oral x2   # Acne vulgaris/folliculitis, face and upper back  -Current: BPO wash, clindamycin lotion  ____________________________________________    Assessment & Plan:   #Benign skin exam including multiple benign nevi, solar lentigines, seborrheic keratoses, cherry angiomas. Explained to patient benign nature of lesion. No treatment is necessary at this time unless the lesion changes or becomes symptomatic.   - ABCDs of melanoma were discussed and self skin checks were advised.  - Sun precaution was advised including the use of sun screens of SPF 30 or higher, sun protective clothing, and avoidance of sun.    # Tinea versicolor, chronic, not at goal of clear  - Reviewed diagnosis and etiology as well as waxing and waning chronic clinical course  - Discussed treatment options including topicals v orals  - Patient elects for both topical and oral medications at this time  - Start ketoconazole shampoo daily, wean to 3 times weekly for maintenance   - Start fluconazole 300 mg once a week for 2 weeks.  Encouraged sweating following taking medication.  Recommended avoiding alcohol while taking medication.  LFTs reviewed from 10/2024, within normal limits.    # Acne vulgaris/folliculitis, face and upper back  - Start BPO wash to affected areas daily.  Reviewed risks of bleaching.   - Start clindamycin lotion twice daily to affected area    Procedures Performed:   None    Follow-up: 5 month(s) in-person, or  "earlier for new or changing lesions    Staff and Resident: [unfilled] Patient seen and staffed with Dr. Doan.       Veronica Macario MD  Dermatology Resident PGY-2    ____________________________________________    CC: Derm Problem (FBSE- Tinea versicolor, all over. Has a few moles of concern)    HPI:  Ms. Morena Solorzano is a(n) 30 year old female who presents today as a new patient for FNSE and rash.  Patient reports that she has had a rash for the last 2 to 3 years that comes and goes.  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  She has tried tinea versicolor soaps and lotions from [employer], cannot recall the name of them, that were relatively helpful.  She reports that the rash is mostly on her back along her chest and abdomen and inguinal folds.    No personal or family history of skin cancer. Denies any history of changing or bleeding or tender spots. She has a couple on her back that are itchy and when she scratches get tender. Reports that \"she is not the best at it\" with wearing sunscreen. A couple of blistering sunburns as a child. No tanning bed use.     Patient is otherwise feeling well, without additional skin concerns.    Labs Reviewed:  N/A    Physical Exam:  Vitals: There were no vitals taken for this visit.  SKIN: Total skin excluding the undergarment areas was performed. The exam included the head/face, neck, both arms, chest, back, abdomen, both legs, digits and/or nails.   - There are dome shaped bright red papules on the trunk and extremities.  - Multiple regular brown pigmented macules and papules are identified on the face trunk and extremities.   - Scattered brown macules on sun exposed areas..   - There are superifical pink acneiform papules and pustules on the forehead and upper back.    -  Hyperpigmented in thin faintly scaling plaques in a paint splatter distribution to the majority of the back, chest, abdomen, and " groin, favoring the intertriginous areas.   - No other lesions of concern on areas examined.     Medications:  Current Outpatient Medications   Medication Sig Dispense Refill     levothyroxine (EUTHYROX) 88 MCG tablet Take 1 tablet (88 mcg) by mouth daily. 90 tablet 0     cyclobenzaprine (FLEXERIL) 10 MG tablet Take 1 tablet (10 mg) by mouth 3 times daily as needed for muscle spasms (Patient not taking: Reported on 6/5/2025) 15 tablet 0     FLOWFLEX COVID-19 AG HOME TEST KIT  (Patient not taking: Reported on 6/5/2025)       methylPREDNISolone (MEDROL DOSEPAK) 4 MG tablet therapy pack Follow Package Directions (Patient not taking: Reported on 6/5/2025) 21 tablet 0     No current facility-administered medications for this visit.      Past Medical History:   Patient Active Problem List   Diagnosis     Acquired hypothyroidism     History reviewed. No pertinent past medical history.    CC Tal Alan MD  [address] [place] [address] on close of this encounter.      Attestation signed by Clive Doan MD at 6/5/2025  1:11 PM:  I have personally examined this patient and agree with the resident doctor's documentation and plan of care. I have reviewed and amended the resident's note. The documentation accurately reflects my clinical observations, diagnoses, treatment and follow-up plans.     Clive Doan MD  Dermatology Staff      Again, thank you for allowing me to participate in the care of your patient.      Sincerely,    Veronica Macario MD

## 2025-06-05 NOTE — PROGRESS NOTES
[facility] Dermatology Note  Encounter Date: Jun 5, 2025  Office Visit     Dermatology Problem List:  FBSE 06/05/25  #Tinea Versicolor   - Current: ketoconazole, fluconazole 300 mg oral x2   # Acne vulgaris/folliculitis, face and upper back  -Current: BPO wash, clindamycin lotion  ____________________________________________    Assessment & Plan:   #Benign skin exam including multiple benign nevi, solar lentigines, seborrheic keratoses, cherry angiomas. Explained to patient benign nature of lesion. No treatment is necessary at this time unless the lesion changes or becomes symptomatic.   - ABCDs of melanoma were discussed and self skin checks were advised.  - Sun precaution was advised including the use of sun screens of SPF 30 or higher, sun protective clothing, and avoidance of sun.    # Tinea versicolor, chronic, not at goal of clear  - Reviewed diagnosis and etiology as well as waxing and waning chronic clinical course  - Discussed treatment options including topicals v orals  - Patient elects for both topical and oral medications at this time  - Start ketoconazole shampoo daily, wean to 3 times weekly for maintenance   - Start fluconazole 300 mg once a week for 2 weeks.  Encouraged sweating following taking medication.  Recommended avoiding alcohol while taking medication.  LFTs reviewed from 10/2024, within normal limits.    # Acne vulgaris/folliculitis, face and upper back  - Start BPO wash to affected areas daily.  Reviewed risks of bleaching.   - Start clindamycin lotion twice daily to affected area    Procedures Performed:   None    Follow-up: 5 month(s) in-person, or earlier for new or changing lesions    Staff and Resident: [unfilled] Patient seen and staffed with Dr. Doan.       Veronica Macario MD  Dermatology Resident PGY-2    ____________________________________________    CC: Derm Problem (FBSE- Tinea versicolor, all over. Has a few moles of concern)    HPI:  Ms. Morena ANTUNEZ  "Rashad is a(n) 30 year old female who presents today as a new patient for FNSE and rash.  Patient reports that she has had a rash for the last 2 to 3 years that comes and goes.  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  She has tried tinea versicolor soaps and lotions from [employer], cannot recall the name of them, that were relatively helpful.  She reports that the rash is mostly on her back along her chest and abdomen and inguinal folds.    No personal or family history of skin cancer. Denies any history of changing or bleeding or tender spots. She has a couple on her back that are itchy and when she scratches get tender. Reports that \"she is not the best at it\" with wearing sunscreen. A couple of blistering sunburns as a child. No tanning bed use.     Patient is otherwise feeling well, without additional skin concerns.    Labs Reviewed:  N/A    Physical Exam:  Vitals: There were no vitals taken for this visit.  SKIN: Total skin excluding the undergarment areas was performed. The exam included the head/face, neck, both arms, chest, back, abdomen, both legs, digits and/or nails.   - There are dome shaped bright red papules on the trunk and extremities.  - Multiple regular brown pigmented macules and papules are identified on the face trunk and extremities.   - Scattered brown macules on sun exposed areas..   - There are superifical pink acneiform papules and pustules on the forehead and upper back.    -  Hyperpigmented in thin faintly scaling plaques in a paint splatter distribution to the majority of the back, chest, abdomen, and groin, favoring the intertriginous areas.   - No other lesions of concern on areas examined.     Medications:  Current Outpatient Medications   Medication Sig Dispense Refill    levothyroxine (EUTHYROX) 88 MCG tablet Take 1 tablet (88 mcg) by mouth daily. 90 tablet 0    cyclobenzaprine (FLEXERIL) 10 MG tablet Take 1 tablet " (10 mg) by mouth 3 times daily as needed for muscle spasms (Patient not taking: Reported on 6/5/2025) 15 tablet 0    FLOWFLEX COVID-19 AG HOME TEST KIT  (Patient not taking: Reported on 6/5/2025)      methylPREDNISolone (MEDROL DOSEPAK) 4 MG tablet therapy pack Follow Package Directions (Patient not taking: Reported on 6/5/2025) 21 tablet 0     No current facility-administered medications for this visit.      Past Medical History:   Patient Active Problem List   Diagnosis    Acquired hypothyroidism     History reviewed. No pertinent past medical history.    CC Tal Alan MD  [address] [place] [address] on close of this encounter.

## 2025-08-25 ENCOUNTER — MYC REFILL (OUTPATIENT)
Dept: PEDIATRICS | Facility: CLINIC | Age: 30
End: 2025-08-25
Payer: COMMERCIAL

## 2025-08-25 DIAGNOSIS — E03.9 ACQUIRED HYPOTHYROIDISM: ICD-10-CM

## 2025-08-25 RX ORDER — LEVOTHYROXINE SODIUM 88 UG/1
88 TABLET ORAL DAILY
Qty: 90 TABLET | Refills: 0 | Status: SHIPPED | OUTPATIENT
Start: 2025-08-25